# Patient Record
Sex: FEMALE | Race: WHITE | NOT HISPANIC OR LATINO | Employment: FULL TIME | URBAN - NONMETROPOLITAN AREA
[De-identification: names, ages, dates, MRNs, and addresses within clinical notes are randomized per-mention and may not be internally consistent; named-entity substitution may affect disease eponyms.]

---

## 2022-01-26 RX ORDER — AMITRIPTYLINE HYDROCHLORIDE 10 MG/1
10 TABLET, FILM COATED ORAL NIGHTLY
COMMUNITY
End: 2022-02-11

## 2022-01-26 RX ORDER — ASCORBIC ACID 500 MG
500 TABLET ORAL DAILY
COMMUNITY
End: 2022-11-17

## 2022-01-26 RX ORDER — SUMATRIPTAN 50 MG/1
50 TABLET, FILM COATED ORAL
COMMUNITY
End: 2023-01-24

## 2022-01-26 RX ORDER — SERTRALINE HYDROCHLORIDE 25 MG/1
12.5 TABLET, FILM COATED ORAL DAILY
COMMUNITY

## 2022-01-26 RX ORDER — LOSARTAN POTASSIUM 25 MG/1
25 TABLET ORAL DAILY
COMMUNITY

## 2022-01-26 RX ORDER — OMEPRAZOLE 40 MG/1
40 CAPSULE, DELAYED RELEASE ORAL DAILY
COMMUNITY
End: 2022-05-17 | Stop reason: DRUGHIGH

## 2022-01-26 RX ORDER — FAMOTIDINE 20 MG
1 TABLET ORAL DAILY
COMMUNITY

## 2022-02-11 ENCOUNTER — OFFICE VISIT (OUTPATIENT)
Dept: NEUROLOGY | Facility: CLINIC | Age: 47
End: 2022-02-11

## 2022-02-11 VITALS
DIASTOLIC BLOOD PRESSURE: 82 MMHG | BODY MASS INDEX: 27.61 KG/M2 | HEART RATE: 77 BPM | SYSTOLIC BLOOD PRESSURE: 122 MMHG | HEIGHT: 68 IN | WEIGHT: 182.2 LBS | OXYGEN SATURATION: 98 %

## 2022-02-11 DIAGNOSIS — G43.009 MIGRAINE WITHOUT AURA AND WITHOUT STATUS MIGRAINOSUS, NOT INTRACTABLE: Primary | ICD-10-CM

## 2022-02-11 PROCEDURE — 99204 OFFICE O/P NEW MOD 45 MIN: CPT | Performed by: PSYCHIATRY & NEUROLOGY

## 2022-02-11 RX ORDER — TOPIRAMATE 25 MG/1
TABLET ORAL
Qty: 120 TABLET | Refills: 3 | Status: SHIPPED | OUTPATIENT
Start: 2022-02-11 | End: 2022-05-17

## 2022-02-11 RX ORDER — ONDANSETRON 4 MG/1
4 TABLET, ORALLY DISINTEGRATING ORAL EVERY 8 HOURS PRN
COMMUNITY
Start: 2021-12-02

## 2022-02-11 NOTE — PROGRESS NOTES
Chief Complaint  Migraine (started 4-5 years ago. increased within the last 2 years located around the left frontal and pressure behind the left eye with nausea/vomiting. takes sumatriptan PRN which helps. pt states since december she has had 12 HA.)    Subjective        Adelita Ferrell presents to [facility]    Patient is a 46-year-old female who is referred for evaluation of headaches.  She started having headaches about 4 to 5 years ago.  Her blood pressure was high at that point so she was put on blood pressure medicine.  She thinks this helps somewhat but the headaches are still there.  In the last 2 years that have been worse.  They are located always on the left,  Mostly frontal and behind the eye.  She is currently having 2-3 headaches per week, although they have been more frequent already this month.  She says sometimes the pain will just linger and then she will feel foggy.  Without any rescue medicine, this could last for 2 to 3 days.  She describes the pain as pressure and throbbing.  She gets photophobia, nausea, and vomiting.  She denies any vision changes.  She was started on sumatriptan 50 mg recently and states this does help to abort a headache.  She was otherwise taking Tylenol.  Laying down helps the headache.  She will have to wear sunglasses or be in a dark room.  Last time she had her eyes checked was 4 years ago but no cause of her headache was seen during that exam.  She does states she has gained 45 pounds in the last 2 years likely due to the pandemic.  She does exercise.  She does endorse trouble sleeping but part of this is due to her 's work schedule.  No family history of migraines.  No head trauma.  She did have whiplash 20 years ago while riding a horse.  Her PCP started her on amitriptyline.  However she has not taken this because she read the side effects, and saw that it could cause headaches.              Past Medical History:   Diagnosis Date  "  • Hypertension    • Migraine           Current Outpatient Medications:   •  ascorbic acid (VITAMIN C) 500 MG tablet, Take 500 mg by mouth Daily., Disp: , Rfl:   •  Cobalamin Combinations (B12 Folate) 800-800 MCG capsule, Take 1 capsule by mouth Daily., Disp: , Rfl:   •  losartan (COZAAR) 25 MG tablet, Take 25 mg by mouth Daily., Disp: , Rfl:   •  omeprazole (priLOSEC) 40 MG capsule, Take 40 mg by mouth Daily., Disp: , Rfl:   •  ondansetron ODT (ZOFRAN-ODT) 4 MG disintegrating tablet, 1 TABLET ON THE TONGUE AND ALLOW TO DISSOLVE EVERY 8 HOURS AS NEEDED FOR NAUSEA FOR 5 DAYS, Disp: , Rfl:   •  sertraline (ZOLOFT) 25 MG tablet, Take 25 mg by mouth Daily., Disp: , Rfl:   •  SUMAtriptan (IMITREX) 50 MG tablet, Take 50 mg by mouth Every 2 (Two) Hours As Needed. Take one tablet at onset of headache. May repeat dose one time in 2 hours if headache not relieved., Disp: , Rfl:   •  Vitamin D, Cholecalciferol, 25 MCG (1000 UT) capsule, Take 1 capsule by mouth Daily., Disp: , Rfl:        Objective   Vital Signs:   /82 (BP Location: Right arm, Patient Position: Sitting, Cuff Size: Adult)   Pulse 77   Ht 172.7 cm (68\")   Wt 82.6 kg (182 lb 3.2 oz)   SpO2 98%   BMI 27.70 kg/m²     Physical Exam  Constitutional:       General: She is awake.   Eyes:      Extraocular Movements: Extraocular movements intact.      Pupils: Pupils are equal, round, and reactive to light.   Neurological:      Mental Status: She is alert.      Deep Tendon Reflexes: Strength normal and reflexes are normal and symmetric.   Psychiatric:         Speech: Speech normal.        Neurological Exam  Mental Status  Awake and alert. Oriented to person, place and time. Recent and remote memory are intact. Speech is normal. Language is fluent with no aphasia. Attention and concentration are normal. Fund of knowledge is appropriate for level of education.    Cranial Nerves  CN II: Visual fields full to confrontation.  CN III, IV, VI: Extraocular movements " intact bilaterally. Pupils equal round and reactive to light bilaterally.  CN V: Facial sensation is normal.  CN VII: Full and symmetric facial movement.  CN IX, X: Palate elevates symmetrically  CN XI: Shoulder shrug strength is normal.  CN XII: Tongue midline without atrophy or fasciculations.    Motor  Normal muscle bulk throughout. Normal muscle tone. No abnormal involuntary movements. Strength is 5/5 throughout all four extremities.    Sensory  Light touch is normal in upper and lower extremities.     Reflexes  Deep tendon reflexes are 2+ and symmetric in all four extremities with downgoing toes bilaterally.    Coordination  Right: Finger-to-nose normal.  Left: Finger-to-nose normal.    Gait  Casual gait is normal including stance, stride, and arm swing.      Result Review :                     Assessment and Plan   46-year-old female with migraine without aura.  She is having migraines frequently enough that she would benefit from a preventative medication.  We discussed multiple options and she decided to try topiramate.  I discussed the potential side effects with her.  She has no history of kidney stones or glaucoma.    Plan:    1.  We will start topiramate 25 mg twice daily for 1 week.  Then increase to 50 mg twice daily.  2.  Continue sumatriptan 50 mg for abortive treatment.  3.  She can try melatonin for sleep.  I recommend 3 or 5 mg only.  4.  Follow-up 3 months.        Follow Up   No follow-ups on file.  Patient was given instructions and counseling regarding her condition or for health maintenance advice. Please see specific information pulled into the AVS if appropriate.

## 2022-02-11 NOTE — PATIENT INSTRUCTIONS
Melatonin: 3 mg or 5 mg, take 30 minutes to an hour before  Topiramate: 25 mg twice a day for a week, then increase to 50 mg twice day

## 2022-02-17 ENCOUNTER — TELEPHONE (OUTPATIENT)
Dept: NEUROLOGY | Facility: CLINIC | Age: 47
End: 2022-02-17

## 2022-02-17 NOTE — TELEPHONE ENCOUNTER
Provider: DR. JACOBO   Caller: PEPE   Relationship to Patient: PT   Phone Number: [phone]  Reason for Call: PT CALLED AND STATES SHE PICKED UP SUMATRIPTAN AND THE PHARMACY STATES THAT THEY SENT SOMETHING OVER FOR TOPIRAMATE. PLEASE ADVISE.

## 2022-02-18 NOTE — TELEPHONE ENCOUNTER
[facility] said [facility] needs [facility].  Notified it has been approved from 2-14-22 to 5-14-22.  Mailbox is full, unable to leave a message for Adelita.

## 2022-05-17 ENCOUNTER — OFFICE VISIT (OUTPATIENT)
Dept: NEUROLOGY | Facility: CLINIC | Age: 47
End: 2022-05-17

## 2022-05-17 VITALS
HEART RATE: 63 BPM | HEIGHT: 68 IN | DIASTOLIC BLOOD PRESSURE: 82 MMHG | BODY MASS INDEX: 24.67 KG/M2 | OXYGEN SATURATION: 99 % | SYSTOLIC BLOOD PRESSURE: 122 MMHG | WEIGHT: 162.8 LBS

## 2022-05-17 DIAGNOSIS — G43.009 MIGRAINE WITHOUT AURA AND WITHOUT STATUS MIGRAINOSUS, NOT INTRACTABLE: Primary | ICD-10-CM

## 2022-05-17 DIAGNOSIS — G43.009 MIGRAINE WITHOUT AURA AND WITHOUT STATUS MIGRAINOSUS, NOT INTRACTABLE: ICD-10-CM

## 2022-05-17 PROCEDURE — 99214 OFFICE O/P EST MOD 30 MIN: CPT | Performed by: PSYCHIATRY & NEUROLOGY

## 2022-05-17 RX ORDER — OMEPRAZOLE 20 MG/1
20 CAPSULE, DELAYED RELEASE ORAL DAILY
COMMUNITY
Start: 2022-04-23

## 2022-05-17 RX ORDER — TOPIRAMATE 25 MG/1
TABLET ORAL
Qty: 360 TABLET | Refills: 1 | OUTPATIENT
Start: 2022-05-17

## 2022-05-17 RX ORDER — TOPIRAMATE 50 MG/1
50 TABLET, FILM COATED ORAL 2 TIMES DAILY
Qty: 180 TABLET | Refills: 3 | Status: SHIPPED | OUTPATIENT
Start: 2022-05-17 | End: 2023-01-24

## 2022-05-17 NOTE — PROGRESS NOTES
"Chief Complaint    Subjective        Adelita Ferrell presents to [facility]    46-year-old female presents for follow-up of migraines.  At last visit we started her on Topamax and she has done very well.  She has significant reduction in her migraines.  Sumatriptan is helping as needed.  She has had some metallic taste in her mouth but this is tolerable.          Past Medical History:   Diagnosis Date   • Hypertension    • Migraine           Current Outpatient Medications:   •  ascorbic acid (VITAMIN C) 500 MG tablet, Take 500 mg by mouth Daily., Disp: , Rfl:   •  Cobalamin Combinations (B12 Folate) 800-800 MCG capsule, Take 1 capsule by mouth Daily., Disp: , Rfl:   •  losartan (COZAAR) 25 MG tablet, Take 25 mg by mouth Daily., Disp: , Rfl:   •  omeprazole (priLOSEC) 20 MG capsule, Take 20 mg by mouth Daily., Disp: , Rfl:   •  ondansetron ODT (ZOFRAN-ODT) 4 MG disintegrating tablet, Place 4 mg on the tongue Every 8 (Eight) Hours As Needed for Nausea or Vomiting., Disp: , Rfl:   •  sertraline (ZOLOFT) 25 MG tablet, Take 25 mg by mouth Daily., Disp: , Rfl:   •  SUMAtriptan (IMITREX) 50 MG tablet, Take 50 mg by mouth Every 2 (Two) Hours As Needed. Take one tablet at onset of headache. May repeat dose one time in 2 hours if headache not relieved., Disp: , Rfl:   •  topiramate (TOPAMAX) 25 MG tablet, 1 pill BID x 1 week, then increase to 2 pills BID thereafter (Patient taking differently: Take 50 mg by mouth 2 (Two) Times a Day.), Disp: 120 tablet, Rfl: 3  •  Vitamin D, Cholecalciferol, 25 MCG (1000 UT) capsule, Take 1 capsule by mouth Daily., Disp: , Rfl:        Objective   Vital Signs:   /82 (BP Location: Left arm, Patient Position: Sitting, Cuff Size: Adult)   Pulse 63   Ht 172.7 cm (68\")   Wt 73.8 kg (162 lb 12.8 oz)   SpO2 99%   BMI 24.75 kg/m²     Physical Exam  Eyes:      Extraocular Movements: Extraocular movements intact.   Neurological:      Deep Tendon Reflexes: Strength " normal.   Psychiatric:         Speech: Speech normal.        Neurological Exam  Mental Status  Awake, alert and oriented to person, place and time. Speech is normal. Language is fluent with no aphasia.    Cranial Nerves  CN III, IV, VI: Extraocular movements intact bilaterally.  CN VII: Full and symmetric facial movement.    Motor   Strength is 5/5 throughout all four extremities.    Gait  Casual gait is normal including stance, stride, and arm swing.      Result Review :                     Assessment and Plan   46-year-old female with migraines.  She has had significant benefit on topiramate so we will continue.  Also doing well on sumatriptan as needed.    Plan:    1.  Continue topiramate 50 mg twice daily.  2.  Continue sumatriptan 50 mg as needed.  3.  Follow-up 6 months.  Call sooner with any issues.        Follow Up   No follow-ups on file.  Patient was given instructions and counseling regarding her condition or for health maintenance advice. Please see specific information pulled into the AVS if appropriate.

## 2022-11-17 ENCOUNTER — TELEPHONE (OUTPATIENT)
Dept: NEUROLOGY | Facility: CLINIC | Age: 47
End: 2022-11-17

## 2022-11-17 ENCOUNTER — OFFICE VISIT (OUTPATIENT)
Dept: NEUROLOGY | Facility: CLINIC | Age: 47
End: 2022-11-17

## 2022-11-17 VITALS
DIASTOLIC BLOOD PRESSURE: 86 MMHG | SYSTOLIC BLOOD PRESSURE: 124 MMHG | HEIGHT: 68 IN | BODY MASS INDEX: 24.92 KG/M2 | OXYGEN SATURATION: 98 % | WEIGHT: 164.4 LBS | HEART RATE: 72 BPM

## 2022-11-17 DIAGNOSIS — N92.1 MENORRHAGIA WITH IRREGULAR CYCLE: Primary | ICD-10-CM

## 2022-11-17 DIAGNOSIS — G43.719 INTRACTABLE CHRONIC MIGRAINE WITHOUT AURA AND WITHOUT STATUS MIGRAINOSUS: ICD-10-CM

## 2022-11-17 PROCEDURE — 99214 OFFICE O/P EST MOD 30 MIN: CPT | Performed by: PSYCHIATRY & NEUROLOGY

## 2022-11-17 RX ORDER — BIOTIN 1000 MCG
1 TABLET,CHEWABLE ORAL DAILY
COMMUNITY

## 2022-11-17 RX ORDER — RIMEGEPANT SULFATE 75 MG/75MG
75 TABLET, ORALLY DISINTEGRATING ORAL ONCE AS NEEDED
Qty: 16 TABLET | Refills: 5 | Status: SHIPPED | OUTPATIENT
Start: 2022-11-17

## 2022-11-17 NOTE — PROGRESS NOTES
"Chief Complaint    Subjective        Adelita Ferrell presents to [facility]    History of Present Illness  46-year-old female presents for follow-up of migraines.  Topamax has helped but still having significant migraines. Will last for days. Also having continued heavy periods.         Past Medical History:   Diagnosis Date   • Hypertension    • Migraine           Current Outpatient Medications:   •  ascorbic acid (VITAMIN C) 500 MG tablet, Take 500 mg by mouth Daily., Disp: , Rfl:   •  Cobalamin Combinations (B12 Folate) 800-800 MCG capsule, Take 1 capsule by mouth Daily., Disp: , Rfl:   •  losartan (COZAAR) 25 MG tablet, Take 25 mg by mouth Daily., Disp: , Rfl:   •  omeprazole (priLOSEC) 20 MG capsule, Take 20 mg by mouth Daily., Disp: , Rfl:   •  ondansetron ODT (ZOFRAN-ODT) 4 MG disintegrating tablet, Place 4 mg on the tongue Every 8 (Eight) Hours As Needed for Nausea or Vomiting., Disp: , Rfl:   •  sertraline (ZOLOFT) 25 MG tablet, Take 25 mg by mouth Daily., Disp: , Rfl:   •  SUMAtriptan (IMITREX) 50 MG tablet, Take 50 mg by mouth Every 2 (Two) Hours As Needed. Take one tablet at onset of headache. May repeat dose one time in 2 hours if headache not relieved., Disp: , Rfl:   •  topiramate (Topamax) 50 MG tablet, Take 1 tablet by mouth 2 (Two) Times a Day for 360 days., Disp: 180 tablet, Rfl: 3  •  Vitamin D, Cholecalciferol, 25 MCG (1000 UT) capsule, Take 1 capsule by mouth Daily., Disp: , Rfl:        Objective   Vital Signs:   Ht 172.7 cm (68\")   BMI 24.75 kg/m²     Physical Exam  Eyes:      Extraocular Movements: Extraocular movements intact.   Neurological:      Motor: Motor strength is normal.   Psychiatric:         Speech: Speech normal.        Neurological Exam  Mental Status  Awake, alert and oriented to person, place and time. Speech is normal. Language is fluent with no aphasia.    Cranial Nerves  CN III, IV, VI: Extraocular movements intact bilaterally.  CN VII: Full " and symmetric facial movement.    Motor   Strength is 5/5 throughout all four extremities.    Gait  Casual gait is normal including stance, stride, and arm swing.      Result Review :                     Assessment and Plan   46-year-old female with migraines. Topamax not working well enough and having some side effects.     Plan:    1.  Wean off topamax.  2. Start Emgality. First injections done in clinic today.  3. Try Nurtec for abortive at onset. Can try Ubrelvy if no benefit.  4. Referral to Dr. Valenzuela for heavy bleeding.  5. Follow up 2 months.       Follow Up   No follow-ups on file.  Patient was given instructions and counseling regarding her condition or for health maintenance advice. Please see specific information pulled into the AVS if appropriate.

## 2022-11-17 NOTE — TELEPHONE ENCOUNTER
Provider: DIAL  Caller: BAHMAN   Relationship to Patient: [facility]  Phone Number: [phone]  Reason for Call: BAHMAN FROM [employer] CALLED REGARDING PA FOR EMGALITY    DOES THE PATIENT USE THE MEDICATION IN COMBINATION WITH ANOTHER CGRP AGENT?    DOES THE PT REQUIRE 2 SYRINGES FOR INITIAL THERAPY?    CAN CALL OR FAX THE ANSWERS TO     PHONE#[phone]  FAX # [phone]    REFERENCE #[id number]    PLEASE REVIEW AND ADVISE     THANK YOU

## 2022-12-07 ENCOUNTER — TELEPHONE (OUTPATIENT)
Dept: NEUROLOGY | Facility: CLINIC | Age: 47
End: 2022-12-07

## 2022-12-07 NOTE — TELEPHONE ENCOUNTER
Spoke with the patient and advised that after speaking with her pharmacy, the Emgality will need to be ordered. She can  the nurtec now with her co-pay card. Patient verbalizes understanding.

## 2022-12-07 NOTE — TELEPHONE ENCOUNTER
PATIENT WENT TO PHARMACY TO  NURTEC AND EMGALITY - SHE WAS TOLD THE PHARMACY NEEDS A CALL FROM OFFICE RELEASING THE MEDICATIONS BEFORE THEY WILL LET HER HAVE THEM.    PLEASE CALL PHARMACY    [facility]/PHARMACY #[id number] - [place] [id number] [facility] [phone] [facility] [phone] FX      THANK YOU

## 2022-12-14 NOTE — TELEPHONE ENCOUNTER
[facility] said [facility] are [facility] Emgality to come in.  They used the co-pay card and Nurtec went through.  Message left for Adelita requesting a return call.

## 2022-12-14 NOTE — TELEPHONE ENCOUNTER
PATIENT CALLED BACK, STATES THE PHARMACY TOLD HER THAT THE MEDICATION HAS NOT BEEN RELEASED, PLEASE CONTACT PHARMACY.    THANK YOU    PATIENT'S CALLBACK NUMBER -[phone]

## 2023-01-12 ENCOUNTER — TELEPHONE (OUTPATIENT)
Dept: NEUROLOGY | Facility: CLINIC | Age: 48
End: 2023-01-12
Payer: COMMERCIAL

## 2023-01-12 NOTE — TELEPHONE ENCOUNTER
Provider: DR DIAL  Caller: PATIENT  Relationship to Patient: SELF  Phone Number: [phone]  Reason for Call: PATIENT CALLED REGARDING HER APPT ON 1/19/23 AT 11:15. PATIENT HAS OTHER APPT ON THIS DAY IN [place] AT 10 &10:30. PATIENT WOULD LIKE TO KNOW IF THERE IS ANY WAY TO MOVE HER 11:15 TO A LITTLE BIT LATER IN THE DAY TO MAKE SURE SHE CAN MAKE IT WHILE STILL KEEPING ALL HER APPTS. PLEASE REVIEW AND ADVISE. THANK YOU.

## 2023-01-12 NOTE — TELEPHONE ENCOUNTER
Explained that Dr. Tello doesn't have a later appointment available.  She is going to reschedule, she needs to see the dermatologist that morning.

## 2023-01-24 ENCOUNTER — OFFICE VISIT (OUTPATIENT)
Dept: OBSTETRICS AND GYNECOLOGY | Facility: CLINIC | Age: 48
End: 2023-01-24
Payer: COMMERCIAL

## 2023-01-24 VITALS
BODY MASS INDEX: 25.46 KG/M2 | DIASTOLIC BLOOD PRESSURE: 84 MMHG | HEIGHT: 68 IN | WEIGHT: 168 LBS | SYSTOLIC BLOOD PRESSURE: 132 MMHG

## 2023-01-24 DIAGNOSIS — Z12.4 ENCOUNTER FOR SCREENING FOR CERVICAL CANCER: ICD-10-CM

## 2023-01-24 DIAGNOSIS — Z01.419 ENCOUNTER FOR GYNECOLOGICAL EXAMINATION WITHOUT ABNORMAL FINDING: ICD-10-CM

## 2023-01-24 DIAGNOSIS — N95.1 PERIMENOPAUSAL: Primary | ICD-10-CM

## 2023-01-24 DIAGNOSIS — R68.82 DECREASED LIBIDO: ICD-10-CM

## 2023-01-24 DIAGNOSIS — N83.291 COMPLEX CYST OF RIGHT OVARY: ICD-10-CM

## 2023-01-24 DIAGNOSIS — Z87.891 FORMER SMOKER: ICD-10-CM

## 2023-01-24 PROCEDURE — 99202 OFFICE O/P NEW SF 15 MIN: CPT | Performed by: OBSTETRICS & GYNECOLOGY

## 2023-01-24 PROCEDURE — 87624 HPV HI-RISK TYP POOLED RSLT: CPT | Performed by: OBSTETRICS & GYNECOLOGY

## 2023-01-24 PROCEDURE — G0123 SCREEN CERV/VAG THIN LAYER: HCPCS | Performed by: OBSTETRICS & GYNECOLOGY

## 2023-01-24 PROCEDURE — 99386 PREV VISIT NEW AGE 40-64: CPT | Performed by: OBSTETRICS & GYNECOLOGY

## 2023-01-24 RX ORDER — TRIAMCINOLONE ACETONIDE 1 MG/G
CREAM TOPICAL
COMMUNITY
Start: 2023-01-19 | End: 2023-03-23

## 2023-01-24 NOTE — PROGRESS NOTES
"Subjective      Adelita Ferrell is a 47 y.o. female who presents for her routine annual exam.     Patient reports she has been having bleeding problems for years.  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  She also reports post-coital bleeding after every encounter.  She reports cramping and bloating with bleeding.  All of this until recently, and periods have just completely stopped for the past four months.  Patient denies hot flashes or night sweats.    Patient reports she is tired and feels like her hormones are \"off\".    Current contraception: tubal ligation  Regular self breast exam: occasionally  History of abnormal Pap smear: no  History of abnormal mammogram: no  Exercise: occasionally    The following portions of the patient's history were reviewed and updated as appropriate: allergies, current medications, past family history, past medical history, past social history, past surgical history and problem list.    heterosexual    Family History  Family History   Problem Relation Age of Onset   • No Known Problems Mother    • No Known Problems Father        Review of Systems  Review of Systems   Constitutional: Negative for activity change and unexpected weight loss.   HENT: Negative for congestion.    Respiratory: Negative for shortness of breath.    Cardiovascular: Negative for chest pain.   Gastrointestinal: Negative for abdominal pain, blood in stool, constipation and diarrhea.        Just had colonoscopy last Tuesday   Endocrine: Negative for cold intolerance and heat intolerance.   Genitourinary: Positive for amenorrhea (since 4 months ago), breast pain (just \"overly sensitive\" sometimes) and decreased libido. Negative for difficulty urinating, dyspareunia, frequency, pelvic pain, urgency, urinary incontinence, vaginal discharge and vaginal pain.   Musculoskeletal: Negative for arthralgias, back " "pain, neck pain and neck stiffness.   Skin: Negative for rash.   Neurological: Positive for headache (cared for by Dr. Son in neurology). Negative for dizziness.   Psychiatric/Behavioral: Negative for sleep disturbance and depressed mood. The patient is nervous/anxious (well-contolled with zoloft).              Objective        /84 (BP Location: Right arm, Patient Position: Sitting, Cuff Size: Adult)   Ht 172.7 cm (68\")   Wt 76.2 kg (168 lb)   LMP 11/14/2022 (Approximate)   Breastfeeding No   BMI 25.54 kg/m²   Physical Exam  Vitals and nursing note reviewed. Exam conducted with a chaperone present.   Constitutional:       General: She is not in acute distress.     Appearance: She is well-developed.   HENT:      Head: Normocephalic and atraumatic.   Cardiovascular:      Rate and Rhythm: Normal rate and regular rhythm.      Heart sounds: No murmur heard.  Pulmonary:      Effort: Pulmonary effort is normal.      Breath sounds: Normal breath sounds.   Chest:   Breasts:     Right: No inverted nipple or mass.      Left: No inverted nipple or mass.   Abdominal:      General: There is no distension.      Palpations: Abdomen is soft.      Tenderness: There is no abdominal tenderness.   Genitourinary:     General: Normal vulva.      Exam position: Lithotomy position.      Labia:         Right: No tenderness or lesion.         Left: No tenderness or lesion.       Vagina: Normal. No vaginal discharge, tenderness or bleeding.      Cervix: No cervical motion tenderness, discharge or friability.      Adnexa:         Right: No tenderness or fullness.          Left: No tenderness or fullness.     Musculoskeletal:         General: Normal range of motion.      Cervical back: Normal range of motion and neck supple.   Skin:     General: Skin is warm and dry.   Neurological:      Mental Status: She is alert and oriented to person, place, and time.   Psychiatric:         Behavior: Behavior normal.         Judgment: Judgment " "normal.     Pelvic u/s:    Impression:     1.  Uterus: Normal size and Anteverted     2.  Endometrium: Not well visualized, reported history of ablation     3.  Myometrium: Normal homogenous texture      4.  Ovaries  Left:    Normal/unremarkable   Right:  Simple cyst 5 cm , Complex cystic mass 6.2 cm  and Has a lacy appearance          Assessment  & Plan    Diagnoses and all orders for this visit:    1. Perimenopausal (Primary): Patient's menses have become irregular, and she has completely skipped menses for the last several months.  The patient has not, however, having vasomotor symptoms.  Additionally, the patient feels like her hormones are \"off\".  We will check hormone levels today to see if the patient is now menopausal  -     Estradiol  -     Follicle Stimulating Hormone  -     Luteinizing Hormone  -     Progesterone  -     Testosterone    2. Decreased libido: Testosterone being included in hormone panel drawn today    3. Complex cyst of right ovary: Patient pelvic ultrasound ordered to investigate changes in bleeding pattern.  The patient was incidentally noted to have a complex cystic mass associated with the right ovary.  The patient has no discomfort or pain on her right side.  The patient has no pelvic complaints.  Shahnaz being drawn today and the patient understands that she will need to see GYN oncology if that is positive.  If the patient's Shahnaz is low risk, the patient will return to the office for a surveillance ultrasound in 3 months.  For now, the patient will have a follow-up appointment made in 3 weeks to return and discuss labs, including her hormone panel.  -     Ovarian Malignancy Risk-SHAHNAZ    4. Encounter for screening for cervical cancer  -     Liquid-based Pap Smear, Screening  -     HPV DNA Probe, Direct - ThinPrep Vial, Cervix, Endocervix    5. Encounter for gynecological examination without abnormal finding: Exam unremarkable.  PAP collected.  Hormones drawn, but other routine screening " labs are managed by PCP.  Patient has upcoming mammogram scheduled, which was also ordered by PCP.  She reports colonoscopy to be up-to-date - it was just done last week but she had a cancerous polyp removed.  Patient has follow-up scheduled with surgeon next week to discuss plan    6. Former smoker        This note was electronically signed.    Sue Valenzuela MD  1/24/2023  12:00 CST

## 2023-01-25 LAB
CANCER AG125 SERPL-ACNC: 9.7 U/ML (ref 0–38.1)
ESTRADIOL SERPL-MCNC: 144 PG/ML
FSH SERPL-ACNC: 4.7 MIU/ML
GEN CATEG CVX/VAG CYTO-IMP: NORMAL
HE4 SERPL-SCNC: 58.9 PMOL/L (ref 0–63.6)
HPV I/H RISK 4 DNA CVX QL PROBE+SIG AMP: NOT DETECTED
LAB AP CASE REPORT: NORMAL
LAB AP GYN ADDITIONAL INFORMATION: NORMAL
LABORATORY COMMENT REPORT: NORMAL
LH SERPL-ACNC: 5.4 MIU/ML
Lab: NORMAL
PATH INTERP SPEC-IMP: NORMAL
POSTMENOPAUSAL INTERP: LOW: NORMAL
PREMENOPAUSAL INTERP: LOW: NORMAL
PROGEST SERPL-MCNC: 10.5 NG/ML
ROMA SCORE POSTMEN SERPL: 1.01
ROMA SCORE PREMEN SERPL: 1.04
STAT OF ADQ CVX/VAG CYTO-IMP: NORMAL
TESTOST SERPL-MCNC: 6 NG/DL (ref 4–50)

## 2023-02-02 ENCOUNTER — TELEPHONE (OUTPATIENT)
Dept: OBSTETRICS AND GYNECOLOGY | Facility: CLINIC | Age: 48
End: 2023-02-02
Payer: COMMERCIAL

## 2023-02-02 NOTE — TELEPHONE ENCOUNTER
Pt updated on result note and voiced understanding.  Result note forwarded to Courtney at  to schedule pt for an appt in 3 months.

## 2023-02-14 ENCOUNTER — OFFICE VISIT (OUTPATIENT)
Dept: OBSTETRICS AND GYNECOLOGY | Facility: CLINIC | Age: 48
End: 2023-02-14
Payer: COMMERCIAL

## 2023-02-14 VITALS
BODY MASS INDEX: 25.76 KG/M2 | HEIGHT: 68 IN | SYSTOLIC BLOOD PRESSURE: 128 MMHG | DIASTOLIC BLOOD PRESSURE: 84 MMHG | WEIGHT: 170 LBS

## 2023-02-14 DIAGNOSIS — G47.00 INSOMNIA, UNSPECIFIED TYPE: ICD-10-CM

## 2023-02-14 DIAGNOSIS — R53.83 OTHER FATIGUE: ICD-10-CM

## 2023-02-14 DIAGNOSIS — N83.291 COMPLEX CYST OF RIGHT OVARY: ICD-10-CM

## 2023-02-14 DIAGNOSIS — N95.1 PERIMENOPAUSAL: Primary | ICD-10-CM

## 2023-02-14 PROCEDURE — 99214 OFFICE O/P EST MOD 30 MIN: CPT | Performed by: OBSTETRICS & GYNECOLOGY

## 2023-02-15 LAB — TSH SERPL DL<=0.005 MIU/L-ACNC: 2.58 UIU/ML (ref 0.27–4.2)

## 2023-02-15 NOTE — PROGRESS NOTES
Subjective   Chief Complaint   Patient presents with   • Ovarian Cyst     Pt here today for follow up after having CARMEN labs. Pt has 3 months follow up and ultrasound in May. Pt voices no other concerns.      Adelita Ferrell is a 47 y.o. year old .  No LMP recorded. Patient has had an ablation.  She presents to be seen to discuss recent labs and plan. Patient was just here a few weeks ago for complex ovarian cyst.  Tumor markers were drawn at that time and returned as low risk.  My recommendation, based on those findings and the fact that she was not having pain, was to return in 3 months for another pelvic ultrasound.  The patient is here today, accompanied by her , to discuss with her labs and the plan.    Component  Ref Range & Units 3 wk ago     0.0 - 38.1 U/mL 9.7    Comment: [employer] Electrochemiluminescence Immunoassay (ECLIA)   Values obtained with different assay methods or kits cannot be   used interchangeably.  Results cannot be interpreted as absolute   evidence of the presence or absence of malignant disease.    HE4  0.0 - 63.6 pmol/L 58.9    Comment: [employer] Electrochemiluminescence Immunoassay (ECLIA)   Values obtained with different assay methods or kits cannot be   used interchangeably.  Results cannot be interpreted as absolute   evidence of the presence or absence of malignant disease.    Premenopausal CARMEN  See below 1.04    Postmenopausal CARMEN  See below 1.01    Comment Comment    Comment: The Risk for Ovarian Malignancy Algorithm (CARMEN) test is intended to   aid in assessing whether a premenopausal or postmenopausal woman who   presents with an ovarian adnexal mass is at high or low likelihood   of finding malignancy on surgery. CARMEN is indicated for women who   meet the following criteria: over age 18; ovarian adnexal mass   present for which surgery is planned, and not yet referred to an   oncologist. CARMEN must be interpreted in conjunction with an  "  independent clinical and radiological assessment. The test is not   intended as a screening or stand-alone diagnostic assay.   HE4 and  values obtained with different assay methods or kits   cannot be used interchangeably.          The following portions of the patient's history were reviewed and updated as appropriate:current medications and allergies    Social History    Tobacco Use      Smoking status: Former        Types: Cigarettes      Smokeless tobacco: Never    Review of Systems   Constitutional: Negative for activity change and unexpected weight change.   Respiratory: Negative for shortness of breath.    Cardiovascular: Negative for chest pain.   Gastrointestinal: Negative for abdominal pain.   Genitourinary: Positive for menstrual problem (becoming irregular recently). Negative for pelvic pain.         Objective   /84   Ht 172.7 cm (68\")   Wt 77.1 kg (170 lb)   BMI 25.85 kg/m²     Physical Exam  Vitals and nursing note reviewed.   Constitutional:       General: She is not in acute distress.     Appearance: She is well-developed.   HENT:      Head: Normocephalic and atraumatic.   Neck:      Thyroid: No thyromegaly.   Pulmonary:      Effort: Pulmonary effort is normal.   Musculoskeletal:         General: Normal range of motion.      Cervical back: Normal range of motion.   Skin:     General: Skin is warm and dry.   Neurological:      Mental Status: She is alert and oriented to person, place, and time.   Psychiatric:         Behavior: Behavior normal.         Judgment: Judgment normal.         Lab Review   Recent tumor markers    Imaging   Pelvic ultrasound report     Assessment & Plan    Diagnoses and all orders for this visit:    1. Perimenopausal (Primary): Menses getting to be irregular.    2. Other fatigue: Hormone levels were checked at the patient's recent visit and found to be normal.  The patient would like to have her TSH checked today.  Adelita feels like she is chronically fatigued, " but also reports that she only sleeps about 5 or 5-1/2 hours every night.  -     TSH    3. Complex cyst of right ovary: Shahnaz was low risk for both premenopausal and postmenopausal ranges.  The patient understands that this does not completely rule out the chance that there is an early ovarian malignancy, but understands that this would be a very small risk.  This has been weighed with the surgical risks of laparoscopic removal of the ovary, although the patient has been offered surgery if that is how she chooses to proceed.  Questions for the patient and her  have all been answered to their satisfaction.  We have all agreed on a plan return to the office in May for repeat pelvic ultrasound and office visit.  Decision about whether or not to proceed to surgery will depend on how the ovarian cyst looks on imaging at that time.    4. Insomnia, unspecified type: We discussed the patient's sleep hygiene, and the avoidance of blue wavelength light in the 1 and half hours before bedtime.  We have also discussed delayed release melatonin since the patient says that she falls asleep very easily but then wakes up every morning about 2 AM.      This note was electronically signed.    Sue Valenzuela MD  February 15, 2023  13:21 CST    Total time spent today with Adelita  was 30-39 minutes (level 4).  Greater than 50% of the time was spent coordinating care, answering her questions and counseling regarding pathophysiology of her presenting problem along with plans for any diagnositc work-up and treatment.       normal...

## 2023-03-23 ENCOUNTER — OFFICE VISIT (OUTPATIENT)
Dept: NEUROLOGY | Facility: CLINIC | Age: 48
End: 2023-03-23
Payer: COMMERCIAL

## 2023-03-23 VITALS
SYSTOLIC BLOOD PRESSURE: 102 MMHG | HEART RATE: 72 BPM | HEIGHT: 68 IN | BODY MASS INDEX: 26.8 KG/M2 | DIASTOLIC BLOOD PRESSURE: 78 MMHG | WEIGHT: 176.8 LBS

## 2023-03-23 DIAGNOSIS — G43.719 INTRACTABLE CHRONIC MIGRAINE WITHOUT AURA AND WITHOUT STATUS MIGRAINOSUS: Primary | ICD-10-CM

## 2023-03-23 PROCEDURE — 99214 OFFICE O/P EST MOD 30 MIN: CPT | Performed by: PSYCHIATRY & NEUROLOGY

## 2023-03-23 NOTE — PROGRESS NOTES
"Chief Complaint    Subjective        Adelita Ferrell presents to [facility]    History of Present Illness  47-year-old female presents for follow-up.  She has had significant improvement in migraine since starting Emgality.  Only having an occasional dull headache for which she will take an OTC or Nurtec.          Past Medical History:   Diagnosis Date   • Hypertension    • Migraine           Current Outpatient Medications:   •  Biotin 1000 MCG chewable tablet, Chew 1 tablet Daily., Disp: , Rfl:   •  Cobalamin Combinations (B12 Folate) 800-800 MCG capsule, Take 1 capsule by mouth Daily., Disp: , Rfl:   •  galcanezumab-gnlm (EMGALITY) 120 MG/ML auto-injector pen, Inject 1 mL under the skin into the appropriate area as directed Every 30 (Thirty) Days., Disp: 1.12 mL, Rfl: 11  •  losartan (COZAAR) 25 MG tablet, Take 1 tablet by mouth Daily., Disp: , Rfl:   •  omeprazole (priLOSEC) 20 MG capsule, Take 1 capsule by mouth Daily., Disp: , Rfl:   •  ondansetron ODT (ZOFRAN-ODT) 4 MG disintegrating tablet, Place 1 tablet on the tongue Every 8 (Eight) Hours As Needed for Nausea or Vomiting., Disp: , Rfl:   •  Rimegepant Sulfate (Nurtec) 75 MG tablet dispersible tablet, Take 1 tablet by mouth 1 (One) Time As Needed (migraine) for up to 1 dose., Disp: 16 tablet, Rfl: 5  •  sertraline (ZOLOFT) 25 MG tablet, Take 12.5 mg by mouth Daily., Disp: , Rfl:   •  Vitamin D, Cholecalciferol, 25 MCG (1000 UT) capsule, Take 1 capsule by mouth Daily., Disp: , Rfl:        Objective   Vital Signs:   /78 (BP Location: Left arm, Patient Position: Sitting, Cuff Size: Large Adult)   Pulse 72   Ht 172.7 cm (68\")   Wt 80.2 kg (176 lb 12.8 oz)   BMI 26.88 kg/m²     Physical Exam  Eyes:      Extraocular Movements: Extraocular movements intact.   Neurological:      Motor: Motor strength is normal.   Psychiatric:         Speech: Speech normal.        Neurological Exam  Mental Status  Awake, alert and oriented to " person, place and time. Speech is normal. Language is fluent with no aphasia.    Cranial Nerves  CN III, IV, VI: Extraocular movements intact bilaterally.  CN VII: Full and symmetric facial movement.    Motor   Strength is 5/5 throughout all four extremities.    Gait  Casual gait is normal including stance, stride, and arm swing.      Result Review :                     Assessment and Plan   47-year-old female with migraines.  Failed Topamax.  Significant benefit with Emgality and Nurtec.    Plan:    1.  Continue Emgality and Nurtec.  2.  Follow-up 6 months.      Follow Up   No follow-ups on file.  Patient was given instructions and counseling regarding her condition or for health maintenance advice. Please see specific information pulled into the AVS if appropriate.

## 2023-03-28 DIAGNOSIS — R10.9 ABDOMINAL PAIN, UNSPECIFIED ABDOMINAL LOCATION: Primary | ICD-10-CM

## 2023-04-24 ENCOUNTER — TELEPHONE (OUTPATIENT)
Dept: OBSTETRICS AND GYNECOLOGY | Facility: CLINIC | Age: 48
End: 2023-04-24
Payer: COMMERCIAL

## 2023-04-24 DIAGNOSIS — R10.9 ABDOMINAL PAIN, UNSPECIFIED ABDOMINAL LOCATION: Primary | ICD-10-CM

## 2023-04-24 NOTE — TELEPHONE ENCOUNTER
Erendira with [facility] called needing a lab order for BMP and CMP to be drawn prior to patients CT scan.  Fax number [phone]

## 2023-05-02 ENCOUNTER — TELEPHONE (OUTPATIENT)
Dept: OBSTETRICS AND GYNECOLOGY | Facility: CLINIC | Age: 48
End: 2023-05-02
Payer: COMMERCIAL

## 2023-05-02 ENCOUNTER — OFFICE VISIT (OUTPATIENT)
Dept: OBSTETRICS AND GYNECOLOGY | Facility: CLINIC | Age: 48
End: 2023-05-02
Payer: COMMERCIAL

## 2023-05-02 VITALS
HEIGHT: 68 IN | SYSTOLIC BLOOD PRESSURE: 126 MMHG | BODY MASS INDEX: 26.07 KG/M2 | WEIGHT: 172 LBS | DIASTOLIC BLOOD PRESSURE: 84 MMHG

## 2023-05-02 DIAGNOSIS — N83.291 COMPLEX CYST OF RIGHT OVARY: Primary | ICD-10-CM

## 2023-05-02 DIAGNOSIS — R10.9 ABDOMINAL PAIN, UNSPECIFIED ABDOMINAL LOCATION: Primary | ICD-10-CM

## 2023-05-02 PROCEDURE — 99213 OFFICE O/P EST LOW 20 MIN: CPT | Performed by: OBSTETRICS & GYNECOLOGY

## 2023-05-02 RX ORDER — TRIAMCINOLONE ACETONIDE 1 MG/G
CREAM TOPICAL
COMMUNITY
Start: 2023-05-01

## 2023-05-02 NOTE — TELEPHONE ENCOUNTER
Adelita from [facility] needs you to sign off on CMP, pended for review, I cannot send verbal with readback.

## 2023-05-02 NOTE — PROGRESS NOTES
"Subjective   Chief Complaint   Patient presents with   • Ovarian Cyst     Pt here today for 3 month follow up on ovarian cyst. Pt had ultrasound today. Pt voices no other concerns.      Adelita Ferrell is a 47 y.o. year old .  No LMP recorded. Patient has had an ablation.  She presents to be seen for follow-up of complex ovarian cyst.  Patient had a repeat pelvic u/s today.  She is currently having no problems and denies any abdominal or pelvic pain.     The following portions of the patient's history were reviewed and updated as appropriate:current medications and allergies    Social History    Tobacco Use      Smoking status: Former        Types: Cigarettes      Smokeless tobacco: Never    Review of Systems   Constitutional: Negative for activity change and unexpected weight change.   Respiratory: Negative for shortness of breath.    Cardiovascular: Negative for chest pain.   Gastrointestinal: Negative for abdominal pain.   Genitourinary: Negative for pelvic pain.         Objective   /84   Ht 172.7 cm (68\")   Wt 78 kg (172 lb)   BMI 26.15 kg/m²     Physical Exam  Vitals and nursing note reviewed.   Constitutional:       General: She is not in acute distress.     Appearance: She is well-developed.   HENT:      Head: Normocephalic and atraumatic.   Neck:      Thyroid: No thyromegaly.   Pulmonary:      Effort: Pulmonary effort is normal.   Abdominal:      General: There is no distension.      Palpations: Abdomen is soft.      Tenderness: There is no abdominal tenderness.   Genitourinary:     Comments: U/s today: uterus 9 X 6 X 4.8, with both ovaries unremarkable - no cysts on either  Musculoskeletal:         General: Normal range of motion.      Cervical back: Normal range of motion.   Skin:     General: Skin is warm and dry.   Neurological:      Mental Status: She is alert and oriented to person, place, and time.   Psychiatric:         Mood and Affect: Mood normal.         Behavior: Behavior normal. "         Thought Content: Thought content normal.         Judgment: Judgment normal.         Lab Review   No data reviewed    Imaging   Pelvic ultrasound report     Assessment & Plan    Diagnoses and all orders for this visit:    1. Complex cyst of right ovary (Primary): Resolved spontaneously.  Pelvic u/s today completely normal.  Patient having no discomfort or complaints.  Will RTO for annual exam, or prn.  Patient has no other questions or concerns.      This note was electronically signed.    Sue Valenzuela MD  May 2, 2023  11:34 CDT    Total time spent today with Adelita  was 20-29 minutes (level 3).  Greater than 50% of the time was spent coordinating care, answering her questions and counseling regarding pathophysiology of her presenting problem along with plans for any diagnositc work-up and treatment.

## 2023-06-14 ENCOUNTER — TELEPHONE (OUTPATIENT)
Dept: OBSTETRICS AND GYNECOLOGY | Facility: CLINIC | Age: 48
End: 2023-06-14
Payer: COMMERCIAL

## 2023-06-14 NOTE — TELEPHONE ENCOUNTER
Called to f/u with Erendira Shen at [facility] Called to f/u with pt on CT that was ordered and looks like it was going to be done at [facility] but we never received the report, she informed me there have been numerous attempts to our office and have not been able to get this resolved.  I informed her the last encounter I had was when I sent over the lab order and she informed me it couldn't be used.  I informed her we were not made aware of this and could not fix problems we did not know existed, she began yelling at me on the phone and informed me she's had multiple issues and I informed her we would just use a different facility.  I called to f/u with pt to see if there was a different facility she would rather use, no answer, left voicemail to return my call at her earliest convenience.

## 2023-06-14 NOTE — TELEPHONE ENCOUNTER
Pt returned call and stated that she is feeling much better and no longer wants to get the CT done anyway, but would definitely use a different facility if something changed and would call and let us know.

## 2023-08-28 DIAGNOSIS — G43.719 INTRACTABLE CHRONIC MIGRAINE WITHOUT AURA AND WITHOUT STATUS MIGRAINOSUS: ICD-10-CM

## 2023-08-28 RX ORDER — RIMEGEPANT SULFATE 75 MG/75MG
75 TABLET, ORALLY DISINTEGRATING ORAL AS NEEDED
Qty: 8 TABLET | Refills: 5 | Status: SHIPPED | OUTPATIENT
Start: 2023-08-28

## 2023-09-06 DIAGNOSIS — G43.719 INTRACTABLE CHRONIC MIGRAINE WITHOUT AURA AND WITHOUT STATUS MIGRAINOSUS: Primary | ICD-10-CM

## 2023-09-06 RX ORDER — RIZATRIPTAN BENZOATE 10 MG/1
10 TABLET ORAL ONCE AS NEEDED
Qty: 9 TABLET | Refills: 0 | Status: SHIPPED | OUTPATIENT
Start: 2023-09-06

## 2023-09-27 ENCOUNTER — TELEPHONE (OUTPATIENT)
Dept: NEUROLOGY | Facility: CLINIC | Age: 48
End: 2023-09-27
Payer: COMMERCIAL

## 2023-09-28 ENCOUNTER — OFFICE VISIT (OUTPATIENT)
Dept: NEUROLOGY | Facility: CLINIC | Age: 48
End: 2023-09-28
Payer: COMMERCIAL

## 2023-09-28 VITALS
HEART RATE: 68 BPM | DIASTOLIC BLOOD PRESSURE: 72 MMHG | WEIGHT: 168 LBS | SYSTOLIC BLOOD PRESSURE: 118 MMHG | RESPIRATION RATE: 18 BRPM | HEIGHT: 68 IN | BODY MASS INDEX: 25.46 KG/M2

## 2023-09-28 DIAGNOSIS — H93.A3 PULSATILE TINNITUS OF BOTH EARS: ICD-10-CM

## 2023-09-28 DIAGNOSIS — M54.2 NECK PAIN, CHRONIC: ICD-10-CM

## 2023-09-28 DIAGNOSIS — R20.2 PARESTHESIA OF LEFT UPPER EXTREMITY: ICD-10-CM

## 2023-09-28 DIAGNOSIS — G89.29 NECK PAIN, CHRONIC: ICD-10-CM

## 2023-09-28 DIAGNOSIS — G43.009 MIGRAINE WITHOUT AURA AND WITHOUT STATUS MIGRAINOSUS, NOT INTRACTABLE: Primary | ICD-10-CM

## 2023-09-28 DIAGNOSIS — R20.2 PARESTHESIA OF RIGHT UPPER EXTREMITY: ICD-10-CM

## 2023-09-28 NOTE — PROGRESS NOTES
Neurology Consult Note    [facility]  [address], [address]  [place] [address]  Phone: [phone]  Fax: [phone]    Referring Provider:   No ref. provider found  Primary Care Provider:  Marita Ford APRN    Reason for Consult:  Migraine follow-up  Subjective        Adelita Ferrell presents to [facility]    History of Present Illness    Very pleasant 48-year-old female seen for follow-up of migraine.  She last follow-up with Dr. Tello on 3/23/2023.  At that time patient reported significant improvement with Emgality.  We attempted to get Nurtec for abortive therapy however insurance denied.  Patient was started on Maxalt for .    Today patient presents by herself.  She reports overall her migraines have improved.  The Maxalt does help decrease the intensity however she does continue with headaches.  She will take over-the-counter ibuprofen at times.  No signs of medication overuse.  She also describes her headaches is not as intense with limited nausea.  She has cut out soft drinks however she does drink several cups of coffee daily.  She also has began yoga and exercise.  She does report having loud sounds in her head, described as loud bangs.  She will hear these in her ears will pop this and will go away.  She did see a chiropractor and had her neck adjusted approximately 8 weeks ago.  She does complain of chronic neck pain.  She did have a neck injury approximately 20 years ago where she was thrown from a horse.  She does get dizzy with turning of her head.  Changing positions can also make her dizzy.  She also reports both of her arms will go numb this can happen when she lays in the bed.    There is no problem list on file for this patient.       Past Medical History:   Diagnosis Date    Hypertension     Migraine         Social History     Socioeconomic History    Marital status:    Tobacco Use    Smoking status: Former     Types:  "Cigarettes    Smokeless tobacco: Never   Vaping Use    Vaping Use: Never used   Substance and Sexual Activity    Alcohol use: Yes     Comment: Occasionally 2 xs months maybe    Drug use: Never    Sexual activity: Yes     Partners: Male     Birth control/protection: Tubal ligation     Comment: W ....        No Known Allergies       Current Outpatient Medications:     galcanezumab-gnlm (EMGALITY) 120 MG/ML auto-injector pen, Inject 1 mL under the skin into the appropriate area as directed Every 30 (Thirty) Days., Disp: 1.12 mL, Rfl: 11    rizatriptan (Maxalt) 10 MG tablet, Take 1 tablet by mouth 1 (One) Time As Needed for Migraine. May repeat in 2 hours if needed, Disp: 9 tablet, Rfl: 0       Objective   Vital Signs:   /72 (BP Location: Left arm, Patient Position: Sitting)   Pulse 68   Resp 18   Ht 172.7 cm (68\")   Wt 76.2 kg (168 lb)   BMI 25.54 kg/m²       Physical Exam  Vitals and nursing note reviewed.   Constitutional:       General: She is not in acute distress.     Appearance: She is normal weight. She is not ill-appearing.   HENT:      Head: Normocephalic.      Right Ear: Hearing, tympanic membrane, ear canal and external ear normal.      Left Ear: Hearing, tympanic membrane, ear canal and external ear normal.   Eyes:      General: Lids are normal.      Extraocular Movements: Extraocular movements intact.      Pupils: Pupils are equal, round, and reactive to light.   Pulmonary:      Effort: Pulmonary effort is normal. No respiratory distress.   Musculoskeletal:      Cervical back: Pain with movement present. No spinous process tenderness or muscular tenderness. Decreased range of motion.   Skin:     General: Skin is warm and dry.      Capillary Refill: Capillary refill takes less than 2 seconds.   Neurological:      Mental Status: She is alert.      Motor: Motor strength is normal.      Deep Tendon Reflexes: Reflexes are normal and symmetric.   Psychiatric:         Mood and Affect: Mood " normal.         Speech: Speech normal.         Behavior: Behavior normal.         Thought Content: Thought content normal.         Judgment: Judgment normal.      Neurological Exam  Mental Status  Alert. Oriented to person, place, time and situation. Speech is normal. Language is fluent with no aphasia.    Cranial Nerves  CN II: Visual fields full to confrontation.  CN III, IV, VI: Extraocular movements intact bilaterally. Normal lids and orbits bilaterally. Pupils equal round and reactive to light bilaterally. No nystagmus.  Negative Oscar-Hallpike maneuver..  CN VII: Full and symmetric facial movement.  CN XII: Tongue midline without atrophy or fasciculations.    Motor  Normal muscle bulk throughout. No fasciculations present. Normal muscle tone. No abnormal involuntary movements. Strength is 5/5 throughout all four extremities.    Sensory  Light touch is normal in upper and lower extremities.     Reflexes  Deep tendon reflexes are 2+ and symmetric in all four extremities.    Gait  Casual gait is normal including stance, stride, and arm swing.    Result Review :   The following data was reviewed by: ALISSA Thrasher on 09/28/2023:       Progress Notes by Cristine Tello MD (03/23/2023 15:00)                  Impression:  Adelita Ferrell is a 48 y.o. female who presents for follow-up of migraine.  For migraine standpoint she is stable.  We will continue Emgality and Maxalt.  For her complaints of her neck, I do have concerns of a vascular injury versus musculoskeletal injury.  I do want to proceed with a MRI of her C-spine to assess for any cervical spine issues that may be contributing to the symptoms.  Additionally I will proceed with a CTA head and neck to assess for carotid dissection, stenosis or possible aneurysm due to her complaints.    Diagnoses and all orders for this visit:    1. Migraine without aura and without status migrainosus, not intractable (Primary)  -     MRI Cervical Spine Without  Contrast; Future  -     CT Angiogram Carotids; Future  -     CT Angiogram Head; Future    2. Paresthesia of right upper extremity  -     MRI Cervical Spine Without Contrast; Future  -     CT Angiogram Carotids; Future  -     CT Angiogram Head; Future    3. Paresthesia of left upper extremity  -     MRI Cervical Spine Without Contrast; Future  -     CT Angiogram Carotids; Future  -     CT Angiogram Head; Future    4. Neck pain, chronic  -     MRI Cervical Spine Without Contrast; Future    5. Pulsatile tinnitus of both ears  -     CT Angiogram Carotids; Future  -     CT Angiogram Head; Future        Plan:  Continue Emgality 120 mg monthly injection for migraine prevention  Continue rizatriptan 10 mg tablet as needed for migraine   MRI C-spine  CTA head  CTA neck  Follow-up in 3 months    The patient and I have discussed the plan of care and she is in full agreement at this time.     Follow Up   Return in about 3 months (around 2023) for Migraine.            Nomi Voss, ALISSA  23  12:02 CDT

## 2023-10-31 ENCOUNTER — HOSPITAL ENCOUNTER (OUTPATIENT)
Dept: MRI IMAGING | Facility: HOSPITAL | Age: 48
Discharge: HOME | End: 2023-10-31
Payer: COMMERCIAL

## 2023-10-31 ENCOUNTER — HOSPITAL ENCOUNTER (OUTPATIENT)
Dept: CT IMAGING | Facility: HOSPITAL | Age: 48
Discharge: HOME | End: 2023-10-31
Payer: COMMERCIAL

## 2023-10-31 DIAGNOSIS — R20.2 PARESTHESIA OF RIGHT UPPER EXTREMITY: ICD-10-CM

## 2023-10-31 DIAGNOSIS — G43.009 MIGRAINE WITHOUT AURA AND WITHOUT STATUS MIGRAINOSUS, NOT INTRACTABLE: ICD-10-CM

## 2023-10-31 DIAGNOSIS — M54.2 NECK PAIN, CHRONIC: ICD-10-CM

## 2023-10-31 DIAGNOSIS — R20.2 PARESTHESIA OF LEFT UPPER EXTREMITY: ICD-10-CM

## 2023-10-31 DIAGNOSIS — G89.29 NECK PAIN, CHRONIC: ICD-10-CM

## 2023-10-31 DIAGNOSIS — H93.A3 PULSATILE TINNITUS OF BOTH EARS: ICD-10-CM

## 2023-10-31 PROCEDURE — 70496 CT ANGIOGRAPHY HEAD: CPT

## 2023-10-31 PROCEDURE — 25510000001 IOPAMIDOL PER 1 ML

## 2023-10-31 PROCEDURE — 72141 MRI NECK SPINE W/O DYE: CPT

## 2023-10-31 PROCEDURE — 70498 CT ANGIOGRAPHY NECK: CPT

## 2023-10-31 RX ADMIN — IOPAMIDOL 100 ML: 755 INJECTION, SOLUTION INTRAVENOUS at 10:24

## 2023-11-01 NOTE — PROGRESS NOTES
My chart message sent to patient after trying to call once. Findings below would recommend a evaluation by neurosurgery and physical therapy. Await patient response.

## 2023-11-03 DIAGNOSIS — R20.2 ARM PARESTHESIA, LEFT: ICD-10-CM

## 2023-11-03 DIAGNOSIS — R20.2 ARM PARESTHESIA, RIGHT: ICD-10-CM

## 2023-11-03 DIAGNOSIS — G89.29 CHRONIC NECK PAIN: ICD-10-CM

## 2023-11-03 DIAGNOSIS — R93.7 ABNORMAL MRI, CERVICAL SPINE: Primary | ICD-10-CM

## 2023-11-03 DIAGNOSIS — M54.2 CHRONIC NECK PAIN: ICD-10-CM

## 2023-11-03 DIAGNOSIS — G43.009 MIGRAINE WITHOUT AURA AND WITHOUT STATUS MIGRAINOSUS, NOT INTRACTABLE: ICD-10-CM

## 2023-11-14 ENCOUNTER — OFFICE VISIT (OUTPATIENT)
Dept: NEUROSURGERY | Facility: CLINIC | Age: 48
End: 2023-11-14
Payer: COMMERCIAL

## 2023-11-14 VITALS — HEIGHT: 68 IN | WEIGHT: 167 LBS | BODY MASS INDEX: 25.31 KG/M2

## 2023-11-14 DIAGNOSIS — M50.30 DDD (DEGENERATIVE DISC DISEASE), CERVICAL: Primary | ICD-10-CM

## 2023-11-14 PROCEDURE — 99204 OFFICE O/P NEW MOD 45 MIN: CPT | Performed by: PHYSICIAN ASSISTANT

## 2023-11-14 RX ORDER — SERTRALINE HYDROCHLORIDE 25 MG/1
TABLET, FILM COATED ORAL
COMMUNITY
End: 2023-11-14

## 2023-11-14 RX ORDER — LOSARTAN POTASSIUM 25 MG/1
TABLET ORAL EVERY 24 HOURS
COMMUNITY
Start: 2023-09-21

## 2023-11-14 RX ORDER — CYCLOBENZAPRINE HCL 5 MG
5 TABLET ORAL NIGHTLY PRN
Qty: 30 TABLET | Refills: 0 | Status: SHIPPED | OUTPATIENT
Start: 2023-11-14

## 2023-11-14 RX ORDER — CETIRIZINE HYDROCHLORIDE 10 MG/1
TABLET ORAL EVERY 24 HOURS
COMMUNITY
Start: 2023-07-31 | End: 2023-11-14

## 2023-11-14 RX ORDER — CHLORTHALIDONE 25 MG/1
TABLET ORAL
COMMUNITY
Start: 2023-07-31 | End: 2023-11-14

## 2023-11-14 RX ORDER — PREDNISONE 10 MG/1
1 TABLET ORAL EVERY 12 HOURS SCHEDULED
COMMUNITY
Start: 2023-07-24 | End: 2023-11-14

## 2023-11-14 RX ORDER — EPINEPHRINE 0.3 MG/.3ML
INJECTION SUBCUTANEOUS
COMMUNITY
Start: 2023-08-10

## 2023-11-14 NOTE — PROGRESS NOTES
"    Chief complaint:   Chief Complaint   Patient presents with    Neck Pain     New patient here for evaluation.       Subjective     HPI: Adelita presents for evaluation of chronic neck pain.  Symptoms started about 20 years ago after a whiplash sort of injury while horseback riding.  She states that she is dealt with the issue for several years but is having more increasing neck stiffness and limited range of motion.  She describes the stiffness is constant and does interfere with her sleep at night.  At one point she was having some parascapular pain but denies any presently.  She does not have any radiating pain to the upper extremities and denies focal weakness and paresthesias.  She is currently utilizing Tylenol for pain.  She stopped seeing a chiropractor about 5 months ago and is scheduled to start physical therapy later on this week.    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  She denies tobacco abuse.    Review of Systems     Past Medical History:   Diagnosis Date    Hypertension     Migraine      Past Surgical History:   Procedure Laterality Date     SECTION  2002    ENDOMETRIAL ABLATION      KNEE CARTILAGE SURGERY Right     TUBAL ABDOMINAL LIGATION       Family History   Problem Relation Age of Onset    No Known Problems Mother     No Known Problems Father      Social History     Tobacco Use    Smoking status: Former     Types: Cigarettes     Passive exposure: Never    Smokeless tobacco: Never   Vaping Use    Vaping Use: Never used   Substance Use Topics    Alcohol use: Yes     Comment: Occasionally 2 xs months maybe    Drug use: Never     (Not in a hospital admission)    Allergies:  Patient has no known allergies.    Objective      Vital Signs  Ht 172.7 cm (68\")   Wt 75.8 kg (167 lb)   BMI 25.39 kg/m²     Physical Exam  Constitutional:       Appearance: Normal appearance. She is well-developed.   HENT:      Head: Normocephalic.   Eyes:      General: Lids are normal.      " Conjunctiva/sclera: Conjunctivae normal.      Pupils: Pupils are equal, round, and reactive to light.   Neck:      Comments: Limited left lateral rotation.  Cardiovascular:      Rate and Rhythm: Normal rate and regular rhythm.   Pulmonary:      Effort: Pulmonary effort is normal.      Breath sounds: Normal breath sounds.   Musculoskeletal:         General: Normal range of motion.      Cervical back: Tenderness present.   Skin:     General: Skin is warm.   Neurological:      Mental Status: She is alert and oriented to person, place, and time.      GCS: GCS eye subscore is 4. GCS verbal subscore is 5. GCS motor subscore is 6.      Cranial Nerves: Cranial nerves 2-12 are intact. No cranial nerve deficit.      Sensory: No sensory deficit.      Gait: Gait is intact.      Deep Tendon Reflexes: Reflexes are normal and symmetric. Reflexes normal.      Reflex Scores:       Tricep reflexes are 2+ on the right side and 2+ on the left side.       Bicep reflexes are 2+ on the right side and 2+ on the left side.       Brachioradialis reflexes are 2+ on the right side and 2+ on the left side.       Patellar reflexes are 2+ on the right side and 2+ on the left side.       Achilles reflexes are 2+ on the right side and 2+ on the left side.  Psychiatric:         Speech: Speech normal.         Behavior: Behavior normal.         Thought Content: Thought content normal.         Neurologic Exam     Mental Status   Oriented to person, place, and time.   Speech: speech is normal   Level of consciousness: alert  Knowledge: good.     Cranial Nerves   Cranial nerves II through XII intact.     CN III, IV, VI   Pupils are equal, round, and reactive to light.    Motor Exam   Muscle bulk: normal  Overall muscle tone: normal    Strength   Right neck flexion: 5/5  Left neck flexion: 5/5  Right neck extension: 5/5  Left neck extension: 5/5  Right deltoid: 5/5  Left deltoid: 5/5  Right biceps: 5/5  Left biceps: 5/5  Right triceps: 5/5  Left triceps:  5/5  Right wrist flexion: 5/5  Left wrist flexion: 5/5  Right wrist extension: 5/5  Left wrist extension: 5/5  Right interossei: 5/5  Left interossei: 5/5  Right abdominals: 5/5  Left abdominals: 5/5    Sensory Exam   Light touch normal.     Gait, Coordination, and Reflexes     Gait  Gait: normal    Reflexes   Right brachioradialis: 2+  Left brachioradialis: 2+  Right biceps: 2+  Left biceps: 2+  Right triceps: 2+  Left triceps: 2+  Right patellar: 2+  Left patellar: 2+  Right achilles: 2+  Left achilles: 2+  Right : 2+  Left : 2+  Right Welch: absent  Left Welch: absent  Right ankle clonus: absent  Left ankle clonus: absent      Imaging review: MRI of the cervical spine was reviewed and demonstrates loss of lordosis with focal kyphosis C5-7.  There is moderate to severe disc degeneration C5-6 and C6-7.  There is mild neuroforaminal stenosis bilaterally at C5-6 and moderate neuroforaminal stenosis bilaterally at C6-7.  There is some ligamentum flavum hypertrophy at C6-7 without significant central stenosis.  No cord signal change or lesions throughout.        Assessment/Plan: I reviewed images in detail with Adelita.  She is reporting posterior neck pain with persistent stiffness.  She is neurologically stable.  She has disc degeneration C5-7 with focal kyphosis.  She is scheduled to start physical therapy later on this week and I would like to see how this helps with her pain.    Since she does have some nights when pain interferes with her sleep, I did give her Flexeril 5 mg to utilize as needed at bedtime.    We discussed avoidance of aggravating activities as well as protective body mechanics.  She will especially avoid overhead lifting and excessive neck hypertension.    I would like to see her back after she completes physical therapy.  If she is still having significant pain, we will get her set up with pain management referral.  I look forward to seeing her at her next visit.    I advised the  patient to call and return sooner for new or worsening complaints of weakness, paresthesias, gait disturbances, or any additional concerns.  Treatment options discussed in detail with Adelita and the patient voiced understanding.  Ms. Ferrell agrees with this plan of care.     Patient is a nonsmoker    The patient's Body mass index is 25.39 kg/m².. BMI is above normal parameters. Recommendations include: educational material    Diagnoses and all orders for this visit:    1. DDD (degenerative disc disease), cervical (Primary)  -     cyclobenzaprine (FLEXERIL) 5 MG tablet; Take 1 tablet by mouth At Night As Needed for Muscle Spasms (MUSCLE SPASM).  Dispense: 30 tablet; Refill: 0          I discussed the patients findings and my recommendations with patient    Tess Cook PA-C  11/14/23  11:00 CST

## 2023-11-15 ENCOUNTER — PATIENT ROUNDING (BHMG ONLY) (OUTPATIENT)
Dept: NEUROSURGERY | Facility: CLINIC | Age: 48
End: 2023-11-15
Payer: COMMERCIAL

## 2023-12-07 DIAGNOSIS — G43.719 INTRACTABLE CHRONIC MIGRAINE WITHOUT AURA AND WITHOUT STATUS MIGRAINOSUS: ICD-10-CM

## 2023-12-07 RX ORDER — GALCANEZUMAB 120 MG/ML
120 INJECTION, SOLUTION SUBCUTANEOUS
Qty: 1 ML | Refills: 11 | Status: SHIPPED | OUTPATIENT
Start: 2023-12-07

## 2023-12-28 ENCOUNTER — OFFICE VISIT (OUTPATIENT)
Dept: NEUROLOGY | Facility: CLINIC | Age: 48
End: 2023-12-28
Payer: COMMERCIAL

## 2023-12-28 VITALS
SYSTOLIC BLOOD PRESSURE: 120 MMHG | HEART RATE: 67 BPM | OXYGEN SATURATION: 98 % | BODY MASS INDEX: 24.55 KG/M2 | HEIGHT: 68 IN | WEIGHT: 162 LBS | DIASTOLIC BLOOD PRESSURE: 76 MMHG

## 2023-12-28 DIAGNOSIS — G43.719 INTRACTABLE CHRONIC MIGRAINE WITHOUT AURA AND WITHOUT STATUS MIGRAINOSUS: ICD-10-CM

## 2023-12-28 RX ORDER — RIZATRIPTAN BENZOATE 10 MG/1
10 TABLET ORAL ONCE AS NEEDED
Qty: 9 TABLET | Refills: 3 | Status: SHIPPED | OUTPATIENT
Start: 2023-12-28

## 2023-12-28 NOTE — PROGRESS NOTES
Neurology Consult Note    [facility]  [address], [address]  [place] [address]  Phone: [phone]  Fax: [phone]    Referring Provider:   No ref. provider found  Primary Care Provider:  Marita Colunga APRN    Reason for Consult:  Migraine  Subjective      Adelita Ferrell presents to [facility]    History of Present Illness  48-year-old female seen for follow-up of chronic migraine.  Last seen in clinic on 2023.  At that time we had continued her Emgality and rizatriptan.  She had ordered MRI of her C-spine as well as CTA head and neck due to having chiropractic adjustments and concern for vascular injury.  Imaging was performed which revealed a normal CTA head and neck.  MRI cervical spine did show multilevel changes.  I did refer patient to neurosurgery for an evaluation.  They recommend continuing physical therapy.    Today patient presents by herself.  She reports a significant improvement in her migraine frequency.  Since last seen she has not reported to migraines.  She has taken Maxalt twice which does help with .  Additionally she is taking magnesium and zinc supplements.  She is doing physical therapy through [facility].  She has reported some minor headaches that respond to Tylenol.  Patient Active Problem List   Diagnosis    DDD (degenerative disc disease), cervical        Past Medical History:   Diagnosis Date    Hypertension     Migraine         Social History     Socioeconomic History    Marital status:    Tobacco Use    Smoking status: Former     Types: Cigarettes     Passive exposure: Never    Smokeless tobacco: Never   Vaping Use    Vaping Use: Never used   Substance and Sexual Activity    Alcohol use: Yes     Comment: Occasionally 2 xs months maybe    Drug use: Never    Sexual activity: Yes     Partners: Male     Birth control/protection: Tubal ligation     Comment: W ....        No Known Allergies  "      Current Outpatient Medications:     cyclobenzaprine (FLEXERIL) 5 MG tablet, Take 1 tablet by mouth At Night As Needed for Muscle Spasms (MUSCLE SPASM)., Disp: 30 tablet, Rfl: 0    EPINEPHrine (EPIPEN) 0.3 MG/0.3ML solution auto-injector injection, USE AS DIRECTED FOR ANAPHYLAXIS. GO TO ER IF USED, Disp: , Rfl:     galcanezumab-gnlm (Emgality) 120 MG/ML auto-injector pen, Inject 1 mL under the skin into the appropriate area as directed Every 30 (Thirty) Days., Disp: 1 mL, Rfl: 11    losartan (COZAAR) 25 MG tablet, Daily., Disp: , Rfl:     rizatriptan (Maxalt) 10 MG tablet, Take 1 tablet by mouth 1 (One) Time As Needed for Migraine. May repeat in 2 hours if needed, Disp: 9 tablet, Rfl: 3       Objective   Vital Signs:   /76   Pulse 67   Ht 172.7 cm (68\")   Wt 73.5 kg (162 lb)   SpO2 98%   BMI 24.63 kg/m²       Physical Exam  Vitals and nursing note reviewed.   Constitutional:       General: She is not in acute distress.     Appearance: She is not ill-appearing.   HENT:      Head: Normocephalic.   Eyes:      Extraocular Movements: Extraocular movements intact.      Pupils: Pupils are equal, round, and reactive to light.   Cardiovascular:      Pulses: Normal pulses.   Pulmonary:      Effort: Pulmonary effort is normal. No respiratory distress.   Skin:     General: Skin is warm and dry.   Neurological:      Mental Status: She is alert.   Psychiatric:         Speech: Speech normal.        Neurological Exam  Mental Status  Alert. Oriented to person, place, time and situation. Speech is normal. Language is fluent with no aphasia.    Cranial Nerves  CN II: Visual fields full to confrontation.  CN III, IV, VI: Extraocular movements intact bilaterally. Pupils equal round and reactive to light bilaterally.  CN VII: Full and symmetric facial movement.    Motor    Moves all extremities equally.    Gait  Casual gait is normal including stance, stride, and arm swing.      Result Review :   The following data was " reviewed by: ALISSA Thrasher on 12/28/2023:       MRI Cervical Spine Without Contrast (10/31/2023 09:46)  Narrative & Impression   EXAMINATION:  MRI CERVICAL SPINE WO CONTRAST-  10/31/2023 8:52 AM CDT     HISTORY: Neck pain, chronic. Migraine headaches. Bilateral upper  extremity paresthesia.     COMPARISON: No comparison study.     TECHNIQUE: Multiplanar imaging was performed.     BONE MARROW AND SPINAL CORD:  There is normal signal within the  visualized vertebral bodies and spinal cord.     DISC LEVELS:     C2-3: The disc is maintained in height. There is no spinal or foraminal  stenosis.     C3-4: The disc is maintained in height. There is mild facet arthropathy.  There is no spinal or foraminal stenosis.     C4-5: There is mild disc narrowing. There is spondylitic and uncinate  spurring. There is mild facet arthropathy. There is no significant  spinal or foraminal stenosis.     C5-6: There is severe disc narrowing with spondylitic and uncinate  spurring. There is minimal dural sac compression. There is no  significant central spinal canal narrowing. There is mild foraminal  narrowing bilaterally.     C6-7: There is severe disc narrowing with spondylitic and uncinates  spurring producing dural sac compression. There is mild narrowing of the  central canal. The central canal measures about 8 mm in AP dimension in  the midline. There is moderate to severe foraminal narrowing left  greater than right.     C7-T1: There is mild disc narrowing. There is uncinate spurring right  greater than left. The facet joints are maintained. There is mild  foraminal narrowing bilaterally.        IMPRESSION:  Abnormalities are noted at multiple disc levels and most  severe at C5-6 and C6-7. Please see the above report.        This report was signed and finalized on 10/31/2023 10:15 AM CDT by Dr. Henrique Shi MD.     CT Angiogram Carotids (10/31/2023 10:13)  Narrative & Impression   Exam: CT ANGIOGRAM CAROTIDS- 10/31/2023  9:57 AM CDT     HISTORY: Carotid artery dissection suspected     DOSE LENGTH PRODUCT: 354 mGy cm. Automated exposure control was also  utilized to decrease patient radiation dose.     Exam: Axial CT angiogram of the neck after the uneventful administration  of Isovue IV contrast. Sagittal and coronal reformations were also  provided for review. 3D images were created on an independent  workstation to better evaluate potential vascular abnormalities.     Comparison: None.     Findings:     Right carotid system: No flow-limiting stenosis.     Left carotid system: No flow-limiting stenosis.     Right vertebral: No flow-limiting stenosis.     Left vertebral: No flow-limiting stenosis.     OTHER:     Lung apices: Clear.     Soft tissues: No gross abnormality.     Osseous structures: No acute or suspicious osseous finding.     Other: None.     IMPRESSION:  Impression:     No dissection or flow-limiting stenosis.     This report was signed and finalized on 10/31/2023 12:09 PM CDT by  Jose Alberto Miller.     CT Angiogram Head (10/31/2023 10:25)  Narrative & Impression   EXAM: CT ANGIOGRAM HEAD- - 10/31/2023 9:57 AM CDT     HISTORY: Dizziness, non-specific       COMPARISON: None.      DOSE LENGTH PRODUCT: 354 mGy cm. Automated exposure control was also  utilized to decrease patient radiation dose.     TECHNIQUE: CT head without contrast was performed with coronal and  sagittal reformations. CTA head was then performed with intravenous  contrast. 3D postprocessing, including MIPs, performed and images saved  to PACS. AI ANALYSIS OF LVO WAS UTILIZED.  Grading of carotid stenosis  performed with NASCET criteria.     FINDINGS:      CT head:     Ventricles and extra-axial CSF spaces are normal in size. No abnormal  intracranial hemorrhage no abnormal brain attenuation. Sella/parasellar  structures are grossly unremarkable. No tonsillar ectopia. Orbits are  grossly unremarkable. Paranasal sinuses are clear. Mastoid air cells  are  clear. No suspicious calvarial or extracranial soft tissue abnormality.     CTA HEAD:     Distal ICAs: No flow-limiting stenosis.     MCAs: No flow-limiting stenosis.     ACAs: No flow-limiting stenosis.     Intracranial vertebrals: No flow-limiting stenosis.     Basilar: No flow-limiting stenosis.     PCAs: No flow-limiting stenosis. Fetal left PCA        OTHER:     Brain: No gross abnormality.     Orbits: No gross abnormality.     Soft tissues: No gross abnormality.     Osseous structures: No acute or suspicious osseous finding.     Other: None.     IMPRESSION:     No flow-limiting stenosis, dissection, or aneurysm.     No acute intracranial abnormality.        This report was signed and finalized on 10/31/2023 12:15 PM CDT by  Jose Alberto Miller.   Progress Notes by Nomi Voss APRN (2023 11:00)  Progress Notes by Tess Cook PA-C (2023 10:30)                 Impression:  Adelita Ferrell is a 48 y.o. female who presents for follow-up of chronic migraine.  She is stable with her preventative treatment of Emgality monthly injections as well as rizatriptan for abortive therapy.  Additionally recommend her continuing physical therapy on her cervical spine.  No further recommendations or changes.    Diagnoses and all orders for this visit:    1. Intractable chronic migraine without aura and without status migrainosus  -     rizatriptan (Maxalt) 10 MG tablet; Take 1 tablet by mouth 1 (One) Time As Needed for Migraine. May repeat in 2 hours if needed  Dispense: 9 tablet; Refill: 3        Plan:  Continue Emgality 120 mg monthly injection for migraine prophylaxis  Continue rizatriptan 10 mg tablet for migraine   Continue physical therapy  Follow-up with neurosurgery as scheduled  Follow-up with PCP as scheduled  Follow-up with me in 6 months or sooner if needed    The patient and I have discussed the plan of care and she is in full agreement at this time.     Follow Up   Return in about  6 months (around 6/28/2024) for Migraine.            Nomi Voss, APRN  12/28/23  10:44 CST

## 2024-01-16 ENCOUNTER — OFFICE VISIT (OUTPATIENT)
Dept: NEUROSURGERY | Facility: CLINIC | Age: 49
End: 2024-01-16
Payer: COMMERCIAL

## 2024-01-16 VITALS — WEIGHT: 167.8 LBS | BODY MASS INDEX: 25.43 KG/M2 | HEIGHT: 68 IN

## 2024-01-16 DIAGNOSIS — M50.30 DDD (DEGENERATIVE DISC DISEASE), CERVICAL: Primary | ICD-10-CM

## 2024-01-16 DIAGNOSIS — Z78.9 NONSMOKER: ICD-10-CM

## 2024-01-16 DIAGNOSIS — E66.3 OVERWEIGHT WITH BODY MASS INDEX (BMI) OF 25 TO 25.9 IN ADULT: ICD-10-CM

## 2024-01-16 PROCEDURE — 99213 OFFICE O/P EST LOW 20 MIN: CPT | Performed by: PHYSICIAN ASSISTANT

## 2024-01-16 NOTE — PROGRESS NOTES
"    Chief complaint:   Chief Complaint   Patient presents with    Neck Pain     Pt states having some pain. Doing some PT states feels like its helping.         Subjective     HPI: Adelita comes in for recheck today.  She has participated in physical therapy and indicates that her cervical pain is improved by at least 50%.  She does continue to have significant stiffness but feels that her pain is improved.  She denies any radiating pain to the upper extremities as well as focal weakness and numbness and tingling.  She has a few more therapy sessions left.  She got her Flexeril filled but has not had to utilize it.  She continues on her regular exercise regimen of yoga, Pilates and weight training.    Review of Systems      Objective      Vital Signs  Ht 172.7 cm (67.99\")   Wt 76.1 kg (167 lb 12.8 oz)   BMI 25.52 kg/m²     Physical Exam  Constitutional:       Appearance: Normal appearance. She is well-developed.   HENT:      Head: Normocephalic.   Eyes:      Pupils: Pupils are equal, round, and reactive to light.   Cardiovascular:      Rate and Rhythm: Normal rate and regular rhythm.   Pulmonary:      Effort: Pulmonary effort is normal.   Musculoskeletal:         General: Normal range of motion.      Cervical back: Normal range of motion.   Skin:     General: Skin is warm.   Neurological:      Mental Status: She is alert and oriented to person, place, and time.      GCS: GCS eye subscore is 4. GCS verbal subscore is 5. GCS motor subscore is 6.      Cranial Nerves: No cranial nerve deficit.      Sensory: No sensory deficit.      Motor: No weakness.      Coordination: Coordination normal.      Gait: Gait is intact. Gait normal.      Deep Tendon Reflexes: Reflexes are normal and symmetric.   Psychiatric:         Speech: Speech normal.         Behavior: Behavior normal.         Thought Content: Thought content normal.         Neurologic Exam     Mental Status   Oriented to person, place, and time.   Speech: speech is " normal   Level of consciousness: alert  Knowledge: good.     Cranial Nerves     CN III, IV, VI   Pupils are equal, round, and reactive to light.    Motor Exam   Muscle bulk: normal  Overall muscle tone: normal    Strength   Right biceps: 5/5  Left biceps: 5/5  Right triceps: 5/5  Left triceps: 5/5  Right wrist flexion: 5/5  Right wrist extension: 5/5  Left wrist extension: 5/5  Right interossei: 5/5  Left interossei: 5/5    Sensory Exam   Light touch normal.     Gait, Coordination, and Reflexes     Gait  Gait: normal    Reflexes   Right : 2+  Left : 2+      Imaging review: MRI of the cervical spine was again reviewed and demonstrates loss of lordosis with focal kyphosis C5-7.  There is moderate to severe disc degeneration C5-6 and C6-7 with mild neuroforaminal stenosis bilaterally at C5-6 and moderate bilateral neuroforaminal stenosis at C6-7.  No high-grade central stenosis throughout.        Assessment/Plan: Adelita continues with some neck stiffness but is doing better having dissipated in physical therapy.  She is neurologically stable.  She has significant disc degeneration C5-7.  She is not interested in pursuing surgery.  We discussed possibility of injections which she would like to consider before proceeding.    We discussed avoidance of aggravating activities as well as protective body mechanics.    I think we can see her back on an as-needed basis but I did encourage her to call promptly should she have any sudden increase pain, focal weakness or other issues or concerns.      Patient is a nonsmoker    The patient's Body mass index is 25.52 kg/m².. BMI is above normal parameters. Recommendations include: educational material    Diagnoses and all orders for this visit:    1. DDD (degenerative disc disease), cervical (Primary)    2. Overweight with body mass index (BMI) of 25 to 25.9 in adult    3. Nonsmoker        I discussed the patients findings and my recommendations with patient  Tess ANDERSON  SOUMYA Cook  01/16/24  09:42 CST

## 2024-01-16 NOTE — PATIENT INSTRUCTIONS
Finish PT and transition to home exercises.  Avoid aggravating activities as discussed  Follow-up here at any time if you have any focal weakness, increased pain or other issues or concerns.

## 2024-02-20 ENCOUNTER — OFFICE VISIT (OUTPATIENT)
Dept: OBSTETRICS AND GYNECOLOGY | Age: 49
End: 2024-02-20
Payer: COMMERCIAL

## 2024-02-20 VITALS
DIASTOLIC BLOOD PRESSURE: 80 MMHG | BODY MASS INDEX: 25.16 KG/M2 | WEIGHT: 166 LBS | HEIGHT: 68 IN | SYSTOLIC BLOOD PRESSURE: 124 MMHG

## 2024-02-20 DIAGNOSIS — N81.6 CYSTOCELE WITH RECTOCELE: ICD-10-CM

## 2024-02-20 DIAGNOSIS — Z12.31 ENCOUNTER FOR SCREENING MAMMOGRAM FOR BREAST CANCER: ICD-10-CM

## 2024-02-20 DIAGNOSIS — K59.00 CONSTIPATION, UNSPECIFIED CONSTIPATION TYPE: ICD-10-CM

## 2024-02-20 DIAGNOSIS — Z87.891 FORMER SMOKER: ICD-10-CM

## 2024-02-20 DIAGNOSIS — N81.10 CYSTOCELE WITH RECTOCELE: ICD-10-CM

## 2024-02-20 DIAGNOSIS — Z01.419 WELL WOMAN EXAM WITH ROUTINE GYNECOLOGICAL EXAM: Primary | ICD-10-CM

## 2024-02-20 DIAGNOSIS — Z12.4 ENCOUNTER FOR SCREENING FOR CERVICAL CANCER: ICD-10-CM

## 2024-02-20 PROCEDURE — 87624 HPV HI-RISK TYP POOLED RSLT: CPT

## 2024-02-20 PROCEDURE — 87661 TRICHOMONAS VAGINALIS AMPLIF: CPT

## 2024-02-20 PROCEDURE — 87625 HPV TYPES 16 & 18 ONLY: CPT

## 2024-02-20 NOTE — PROGRESS NOTES
"Subjective     Adelita Ferrell is a 48 y.o. female    History of Present Illness  The patient presents to [facility] and here today for her annual well woman examination. The patient reports periods remain inconsistent with the longest gap between cycles being 6 months. She states despite her ablation, cycles have remained inconsistent. She is experiencing vasomotor symptoms as well, mostly night sweats. Additionally, she has noticed constipation becoming an issue with use of enemas and stool softeners starting in 2023 and again in 2024.    Gynecologic Exam  The patient's pertinent negatives include no genital itching, genital lesions, genital odor, genital rash, missed menses, pelvic pain, vaginal bleeding or vaginal discharge. This is a new problem. The current episode started more than 1 month ago. The problem occurs intermittently. The problem has been unchanged. The patient is experiencing no pain. Associated symptoms include constipation. Pertinent negatives include no abdominal pain, anorexia, back pain, chills, diarrhea, discolored urine, dysuria, fever, flank pain, frequency, headaches, hematuria, joint pain, joint swelling, nausea, painful intercourse, rash, sore throat, urgency or vomiting. She is sexually active. No, her partner does not have an STD. She uses tubal ligation for contraception. Her menstrual history has been irregular. Her past medical history is significant for a  section. The maximum temperature recorded prior to her arrival was no fever.     /80 (BP Location: Left arm, Patient Position: Sitting, Cuff Size: Adult)   Ht 172.7 cm (68\")   Wt 75.3 kg (166 lb)   LMP 2024   BMI 25.24 kg/m²     Outpatient Encounter Medications as of 2024   Medication Sig Dispense Refill    cyclobenzaprine (FLEXERIL) 5 MG tablet Take 1 tablet by mouth At Night As Needed for Muscle Spasms (MUSCLE SPASM). 30 tablet 0    EPINEPHrine (EPIPEN) 0.3 MG/0.3ML solution " auto-injector injection USE AS DIRECTED FOR ANAPHYLAXIS. GO TO ER IF USED      galcanezumab-gnlm (Emgality) 120 MG/ML auto-injector pen Inject 1 mL under the skin into the appropriate area as directed Every 30 (Thirty) Days. 1 mL 11    losartan (COZAAR) 25 MG tablet Daily.      rizatriptan (Maxalt) 10 MG tablet Take 1 tablet by mouth 1 (One) Time As Needed for Migraine. May repeat in 2 hours if needed (Patient not taking: Reported on 2024) 9 tablet 3     No facility-administered encounter medications on file as of 2024.       Past Medical History  Past Medical History:   Diagnosis Date    Cervical disc disorder 20 yrs ago    Hypertension     Migraine        Surgical History  Past Surgical History:   Procedure Laterality Date     SECTION      ENDOMETRIAL ABLATION      KNEE CARTILAGE SURGERY Right     TUBAL ABDOMINAL LIGATION         Family History  Family History   Problem Relation Age of Onset    No Known Problems Mother     No Known Problems Father        The following portions of the patient's history were reviewed and updated as appropriate: allergies, current medications, past family history, past medical history, past social history, past surgical history, and problem list.    Review of Systems   Constitutional: Negative.  Negative for chills and fever.   HENT: Negative.  Negative for sore throat.    Eyes: Negative.    Respiratory: Negative.     Cardiovascular: Negative.    Gastrointestinal:  Positive for constipation. Negative for abdominal pain, anorexia, diarrhea, nausea and vomiting.   Endocrine: Negative.    Genitourinary:  Positive for urinary incontinence. Negative for breast discharge, breast lump, breast pain, dyspareunia, dysuria, flank pain, frequency, hematuria, missed menses, pelvic pain, urgency and vaginal discharge.   Musculoskeletal: Negative.  Negative for back pain and joint pain.   Skin: Negative.  Negative for rash.   Allergic/Immunologic: Negative.    Neurological:  Negative.    Hematological: Negative.    Psychiatric/Behavioral: Negative.       Objective   Physical Exam  Vitals and nursing note reviewed. Exam conducted with a chaperone present.   Constitutional:       General: She is not in acute distress.     Appearance: She is well-developed. She is not diaphoretic.   HENT:      Head: Normocephalic.      Right Ear: External ear normal.      Left Ear: External ear normal.      Nose: Nose normal.   Eyes:      General: No scleral icterus.        Right eye: No discharge.         Left eye: No discharge.      Conjunctiva/sclera: Conjunctivae normal.      Pupils: Pupils are equal, round, and reactive to light.   Neck:      Thyroid: No thyromegaly.      Vascular: No carotid bruit.      Trachea: No tracheal deviation.   Cardiovascular:      Rate and Rhythm: Normal rate and regular rhythm.      Heart sounds: Normal heart sounds. No murmur heard.  Pulmonary:      Effort: Pulmonary effort is normal. No respiratory distress.      Breath sounds: Normal breath sounds. No wheezing.   Chest:   Breasts:     Breasts are symmetrical.      Right: Normal. No swelling, bleeding, inverted nipple, mass, nipple discharge, skin change or tenderness.      Left: Normal. No swelling, bleeding, inverted nipple, mass, nipple discharge, skin change or tenderness.   Abdominal:      General: Abdomen is flat. Bowel sounds are normal. There is no distension.      Palpations: Abdomen is soft. There is no mass.      Tenderness: There is no abdominal tenderness. There is no right CVA tenderness, left CVA tenderness or guarding.      Hernia: No hernia is present. There is no hernia in the left inguinal area or right inguinal area.   Genitourinary:     General: Normal vulva.      Exam position: Lithotomy position.      Labia:         Right: No rash, tenderness, lesion or injury.         Left: No rash, tenderness, lesion or injury.       Vagina: Normal. No signs of injury and foreign body. No vaginal discharge,  erythema, tenderness or bleeding.      Cervix: Normal.      Uterus: Normal. Not enlarged, not fixed and not tender.       Adnexa: Right adnexa normal and left adnexa normal.        Right: No mass, tenderness or fullness.          Left: No mass, tenderness or fullness.        Rectum: Normal. No mass.      Comments: BSU normal  Urethral meatus  Normal  Perineum  Normal  Grade II-III cystocele with rectocele noted with pelvic examination  Musculoskeletal:         General: No tenderness. Normal range of motion.      Cervical back: Normal range of motion and neck supple.   Lymphadenopathy:      Head:      Right side of head: No submental, submandibular, tonsillar, preauricular, posterior auricular or occipital adenopathy.      Left side of head: No submental, submandibular, tonsillar, preauricular, posterior auricular or occipital adenopathy.      Cervical: No cervical adenopathy.      Right cervical: No superficial, deep or posterior cervical adenopathy.     Left cervical: No superficial, deep or posterior cervical adenopathy.      Upper Body:      Right upper body: No supraclavicular, axillary or pectoral adenopathy.      Left upper body: No supraclavicular, axillary or pectoral adenopathy.      Lower Body: No right inguinal adenopathy. No left inguinal adenopathy.   Skin:     General: Skin is warm and dry.      Findings: No bruising, erythema or rash.   Neurological:      Mental Status: She is alert and oriented to person, place, and time.      Coordination: Coordination normal.   Psychiatric:         Mood and Affect: Mood normal.         Behavior: Behavior normal.         Thought Content: Thought content normal.         Judgment: Judgment normal.       PHQ-9 Depression Screening  Little interest or pleasure in doing things? 0-->not at all   Feeling down, depressed, or hopeless? 0-->not at all   Trouble falling or staying asleep, or sleeping too much?     Feeling tired or having little energy?     Poor appetite or  overeating?     Feeling bad about yourself - or that you are a failure or have let yourself or your family down?     Trouble concentrating on things, such as reading the newspaper or watching television?     Moving or speaking so slowly that other people could have noticed? Or the opposite - being so fidgety or restless that you have been moving around a lot more than usual?     Thoughts that you would be better off dead, or of hurting yourself in some way?     PHQ-9 Total Score 0   If you checked off any problems, how difficult have these problems made it for you to do your work, take care of things at home, or get along with other people?          Assessment & Plan   Diagnoses and all orders for this visit:    1. Well woman exam with routine gynecological exam (Primary)  Comments:  The patient presents to [facility] today for her annual well woman examination. The patient is established with her pcp and annual labs are due at this time. She states she will be making an appointment to see her pcp in the near future.     2. Encounter for screening for cervical cancer  Comments:  The patient is sexually active with her  and declines std screening today. Her last cervical cancer screening pap smear was completed on 1/24/2023 and was normal. We did discuss ASCCP guidelines and her previous results. She requests pap smear to be completed with this office visit today. BV panel added to pap smear.   Orders:  -     Liquid-based Pap Smear, Screening    3. Encounter for screening mammogram for breast cancer  Comments:  The patient's mammogram was completed last year at [facility] and she wishes to have this completed at [facility]. I have placed the order for this to be scheduled and completed in the near future.   Orders:  -     Mammo screening digital tomosynthesis bilateral w CAD; Future    4. Cystocele with rectocele  Comments:  The patient experiences daily LEO mostly with exercising, sneezing,  laughing, etc. In November 2023 she started experiencing constipation with a second round of this noted in January 2024. She states that she has noticed stool does not come out as easily as before and sometimes has to do some positioning to help with bowel movement. In November, she did have to use an enema to provide relief and she has started taking daily stool softeners. I did suggest maybe using a cap full of Miralax three times a week and increasing fiber to prevent constipation. I included information regarding kegel exercises and the patient is agreeable to try pelvic floor physical therapy to help improve symptoms. She will return in 3 months to see Dr. Valenzuela for further evaluation.   Orders:  -     Ambulatory Referral to Physical Therapy Pelvic Floor    5. Constipation, unspecified constipation type  Comments:  The patient will be due for her screening colonoscopy she thought possibly this year and will follow up on scheduling this. She has started to incorporate use of daily stool softeners along with a healthy diet to help relieve constipation symptoms.     6. Former smoker      GYN exam completed today. Encouraged SBE. We discussed how to do self breast exam and the importance of same. Colonoscopy will be scheduled. Mammogram will be scheduled. Pap smear is done per ASCCP guidelines. HPV is done. Lab work up will be scheduled.         Carolina Rowley, APRN  2/20/2024

## 2024-02-20 NOTE — PATIENT INSTRUCTIONS
Preventive Care 40-64 Years Old, Female  Preventive care refers to lifestyle choices and visits with your health care provider that can promote health and wellness. Preventive care visits are also called wellness exams.  What can I expect for my preventive care visit?  Counseling  Your health care provider may ask you questions about your:  Medical history, including:  Past medical problems.  Family medical history.  Pregnancy history.  Current health, including:  Menstrual cycle.  Method of birth control.  Emotional well-being.  Home life and relationship well-being.  Sexual activity and sexual health.  Lifestyle, including:  Alcohol, nicotine or tobacco, and drug use.  Access to firearms.  Diet, exercise, and sleep habits.  Work and work environment.  Sunscreen use.  Safety issues such as seatbelt and bike helmet use.  Physical exam  Your health care provider will check your:  Height and weight. These may be used to calculate your BMI (body mass index). BMI is a measurement that tells if you are at a healthy weight.  Waist circumference. This measures the distance around your waistline. This measurement also tells if you are at a healthy weight and may help predict your risk of certain diseases, such as type 2 diabetes and high blood pressure.  Heart rate and blood pressure.  Body temperature.  Skin for abnormal spots.  What immunizations do I need?    Vaccines are usually given at various ages, according to a schedule. Your health care provider will recommend vaccines for you based on your age, medical history, and lifestyle or other factors, such as travel or where you work.  What tests do I need?  Screening  Your health care provider may recommend screening tests for certain conditions. This may include:  Lipid and cholesterol levels.  Diabetes screening. This is done by checking your blood sugar (glucose) after you have not eaten for a while (fasting).  Pelvic exam and Pap test.  Hepatitis B test.  Hepatitis C  test.  HIV (human immunodeficiency virus) test.  STI (sexually transmitted infection) testing, if you are at risk.  Lung cancer screening.  Colorectal cancer screening.  Mammogram. Talk with your health care provider about when you should start having regular mammograms. This may depend on whether you have a family history of breast cancer.  BRCA-related cancer screening. This may be done if you have a family history of breast, ovarian, tubal, or peritoneal cancers.  Bone density scan. This is done to screen for osteoporosis.  Talk with your health care provider about your test results, treatment options, and if necessary, the need for more tests.  Follow these instructions at home:  Eating and drinking    Eat a diet that includes fresh fruits and vegetables, whole grains, lean protein, and low-fat dairy products.  Take vitamin and mineral supplements as recommended by your health care provider.  Do not drink alcohol if:  Your health care provider tells you not to drink.  You are pregnant, may be pregnant, or are planning to become pregnant.  If you drink alcohol:  Limit how much you have to 0-1 drink a day.  Know how much alcohol is in your drink. In the [place] one drink equals one 12 oz bottle of beer (355 mL), one 5 oz glass of wine (148 mL), or one 1½ oz glass of hard liquor (44 mL).  Lifestyle  Brush your teeth every morning and night with fluoride toothpaste. Floss one time each day.  Exercise for at least 30 minutes 5 or more days each week.  Do not use any products that contain nicotine or tobacco. These products include cigarettes, chewing tobacco, and vaping devices, such as e-cigarettes. If you need help quitting, ask your health care provider.  Do not use drugs.  If you are sexually active, practice safe sex. Use a condom or other form of protection to prevent STIs.  If you do not wish to become pregnant, use a form of birth control. If you plan to become pregnant, see your health care provider for a  prepregnancy visit.  Take aspirin only as told by your health care provider. Make sure that you understand how much to take and what form to take. Work with your health care provider to find out whether it is safe and beneficial for you to take aspirin daily.  Find healthy ways to manage stress, such as:  Meditation, yoga, or listening to music.  Journaling.  Talking to a trusted person.  Spending time with friends and family.  Minimize exposure to UV radiation to reduce your risk of skin cancer.  Safety  Always wear your seat belt while driving or riding in a vehicle.  Do not drive:  If you have been drinking alcohol. Do not ride with someone who has been drinking.  When you are tired or distracted.  While texting.  If you have been using any mind-altering substances or drugs.  Wear a helmet and other protective equipment during sports activities.  If you have firearms in your house, make sure you follow all gun safety procedures.  Seek help if you have been physically or sexually abused.  What's next?  Visit your health care provider once a year for an annual wellness visit.  Ask your health care provider how often you should have your eyes and teeth checked.  Stay up to date on all vaccines.  This information is not intended to replace advice given to you by your health care provider. Make sure you discuss any questions you have with your health care provider.  Document Revised: 06/15/2022 Document Reviewed: 06/15/2022  [employer] Patient Education © 2023 [employer]     Kegel Exercises    Kegel exercises can help strengthen your pelvic floor muscles. The pelvic floor is a group of muscles that support your rectum, small intestine, and bladder. In females, pelvic floor muscles also help support the uterus. These muscles help you control the flow of urine and stool (feces).  Kegel exercises are painless and simple. They do not require any equipment. Your provider may suggest Kegel exercises to:  Improve bladder  and bowel control.  Improve sexual response.  Improve weak pelvic floor muscles after surgery to remove the uterus (hysterectomy) or after pregnancy, in females.  Improve weak pelvic floor muscles after prostate gland removal or surgery, in males.  Kegel exercises involve squeezing your pelvic floor muscles. These are the same muscles you squeeze when you try to stop the flow of urine or keep from passing gas. The exercises can be done while sitting, standing, or lying down, but it is best to vary your position.  Ask your health care provider which exercises are safe for you. Do exercises exactly as told by your health care provider and adjust them as directed. Do not begin these exercises until told by your health care provider.  Exercises  How to do Kegel exercises:  Squeeze your pelvic floor muscles tight. You should feel a tight lift in your rectal area. If you are a female, you should also feel a tightness in your vaginal area. Keep your stomach, buttocks, and legs relaxed.  Hold the muscles tight for up to 10 seconds.  Breathe normally.  Relax your muscles for up to 10 seconds.  Repeat as told by your health care provider.  Repeat this exercise daily as told by your health care provider. Continue to do this exercise for at least 4-6 weeks, or for as long as told by your health care provider.  You may be referred to a physical therapist who can help you learn more about how to do Kegel exercises.  Depending on your condition, your health care provider may recommend:  Varying how long you squeeze your muscles.  Doing several sets of exercises every day.  Doing exercises for several weeks.  Making Kegel exercises a part of your regular exercise routine.  This information is not intended to replace advice given to you by your health care provider. Make sure you discuss any questions you have with your health care provider.  Document Revised: 04/28/2022 Document Reviewed: 04/28/2022  [employer] Patient Education ©  2024 [employer]

## 2024-02-22 LAB — TRICHOMONAS VAGINALIS PCR: NOT DETECTED

## 2024-02-26 LAB
GEN CATEG CVX/VAG CYTO-IMP: NORMAL
HPV I/H RISK 4 DNA CVX QL PROBE+SIG AMP: DETECTED
HPV16 DNA SPEC QL NAA+PROBE: NOT DETECTED
HPV18+45 E6+E7 MRNA CVX QL NAA+PROBE: NOT DETECTED
LAB AP CASE REPORT: NORMAL
LAB AP GYN ADDITIONAL INFORMATION: NORMAL
LAB AP GYN OTHER FINDINGS: NORMAL
Lab: NORMAL
PATH INTERP SPEC-IMP: NORMAL
STAT OF ADQ CVX/VAG CYTO-IMP: NORMAL

## 2024-03-04 ENCOUNTER — OFFICE VISIT (OUTPATIENT)
Dept: OBSTETRICS AND GYNECOLOGY | Age: 49
End: 2024-03-04
Payer: COMMERCIAL

## 2024-03-04 VITALS
BODY MASS INDEX: 24.86 KG/M2 | SYSTOLIC BLOOD PRESSURE: 118 MMHG | DIASTOLIC BLOOD PRESSURE: 82 MMHG | WEIGHT: 164 LBS | HEIGHT: 68 IN

## 2024-03-04 DIAGNOSIS — N83.291 COMPLEX CYST OF RIGHT OVARY: ICD-10-CM

## 2024-03-04 DIAGNOSIS — R87.619 ENDOMETRIAL CELLS ON CERVICAL PAPANICOLAOU SMEAR INCONSISTENT WITH LAST MENSTRUAL PERIOD: Primary | ICD-10-CM

## 2024-03-04 LAB
B-HCG UR QL: NEGATIVE
EXPIRATION DATE: NORMAL
INTERNAL NEGATIVE CONTROL: NORMAL
INTERNAL POSITIVE CONTROL: NORMAL
Lab: NORMAL

## 2024-03-04 PROCEDURE — 88305 TISSUE EXAM BY PATHOLOGIST: CPT

## 2024-03-04 PROCEDURE — 99213 OFFICE O/P EST LOW 20 MIN: CPT

## 2024-03-04 PROCEDURE — 58100 BIOPSY OF UTERUS LINING: CPT

## 2024-03-04 PROCEDURE — 99459 PELVIC EXAMINATION: CPT

## 2024-03-04 PROCEDURE — 81025 URINE PREGNANCY TEST: CPT

## 2024-03-04 NOTE — PROGRESS NOTES
"Chief Complaint   Patient presents with    Follow-up     Patient here for EMB and u/s due to pap smear results.        History:  Adelita Ferrell is a 48 y.o. female who presents today for follow-up for evaluation of the above:  Endometrial biopsy for endometrial cells found on her most recent pap smear performed on 2/20/2024.        ROS:  Review of Systems   Constitutional: Negative.    HENT: Negative.     Eyes: Negative.    Respiratory: Negative.     Cardiovascular: Negative.    Gastrointestinal: Negative.    Endocrine: Negative.    Genitourinary: Negative.    Musculoskeletal: Negative.    Skin: Negative.    Allergic/Immunologic: Negative.    Neurological: Negative.    Hematological: Negative.    Psychiatric/Behavioral: Negative.         Ms. Ferrell  reports that she has quit smoking. Her smoking use included cigarettes. She has a 0.3 pack-year smoking history. She has never been exposed to tobacco smoke. She has never used smokeless tobacco. She reports that she does not currently use alcohol. She reports that she does not use drugs.      Current Outpatient Medications:     cyclobenzaprine (FLEXERIL) 5 MG tablet, Take 1 tablet by mouth At Night As Needed for Muscle Spasms (MUSCLE SPASM)., Disp: 30 tablet, Rfl: 0    EPINEPHrine (EPIPEN) 0.3 MG/0.3ML solution auto-injector injection, USE AS DIRECTED FOR ANAPHYLAXIS. GO TO ER IF USED, Disp: , Rfl:     galcanezumab-gnlm (Emgality) 120 MG/ML auto-injector pen, Inject 1 mL under the skin into the appropriate area as directed Every 30 (Thirty) Days., Disp: 1 mL, Rfl: 11    losartan (COZAAR) 25 MG tablet, Daily., Disp: , Rfl:     rizatriptan (Maxalt) 10 MG tablet, Take 1 tablet by mouth 1 (One) Time As Needed for Migraine. May repeat in 2 hours if needed, Disp: 9 tablet, Rfl: 3      OBJECTIVE:  /82 (BP Location: Left arm, Patient Position: Sitting, Cuff Size: Adult)   Ht 172.7 cm (68\")   Wt 74.4 kg (164 lb)   LMP 02/16/2024   BMI 24.94 kg/m²    Physical " Exam  Vitals and nursing note reviewed. Exam conducted with a chaperone present.   Constitutional:       Appearance: She is well-developed.   HENT:      Head: Normocephalic and atraumatic.   Abdominal:      General: There is no distension.      Palpations: Abdomen is soft.      Tenderness: There is no abdominal tenderness.   Genitourinary:     General: Normal vulva.      Exam position: Lithotomy position.      Labia:         Right: No rash, tenderness, lesion or injury.         Left: No rash, tenderness, lesion or injury.       Vagina: Normal. No vaginal discharge, erythema or tenderness.      Cervix: No cervical motion tenderness, discharge or friability.      Uterus: Deviated (arcuate uterus on ultrasound). Not enlarged and not tender.       Adnexa:         Right: No mass, tenderness or fullness.          Left: No mass, tenderness or fullness.     Skin:     General: Skin is warm and dry.   Neurological:      Mental Status: She is alert and oriented to person, place, and time.   Psychiatric:         Behavior: Behavior normal.         Thought Content: Thought content normal.         Judgment: Judgment normal.       Assessment/Plan    Diagnoses and all orders for this visit:    1. Endometrial cells on cervical Papanicolaou smear inconsistent with last menstrual period (Primary)  Comments:  The patient presents today for emb due to endometrial cells in a woman >=45 years of age on her most recent pap smear performed on 2/20/2024. Endometrial lining today on ultrasound is 5.5 mm.   Orders:  -     POC Pregnancy, Urine (negative)  -     Tissue Pathology Exam    2. Complex cyst of right ovary  Comments:  Ultrasound today reveals a complex right ovarian cyst with length of 4.38 cm x width 3.57 cm x height 4.24 cm x volume 34.67 ml. Additionally, there is a small amount of endometrium visualized in the fundus. Due to complex nature of cyst, CARMEN will be ordered and drawn today. The patient will return in 4 weeks to see a  physician in the office to discuss emb results, lab work, and treatment options if indicated.   Orders:  -     Ovarian Malignancy Risk-CARMEN; Future          An After Visit Summary was printed and given to the patient at discharge.  Return in about 4 weeks (around 4/1/2024) for with MD office visit, with OV and U/S. Sooner if problems arise.          An endometrial biopsy was performed in the office today.  Urine Pregnancy test was negative. The cervix was visualized with a speculum and prepped with Betadine.  The anterior lip was grasped with a tenaculum and a standard endometrial sampling Pipelle was passed into the uterine cavity.  The patient experienced mild cramping and the uterus sounded to 6 cm.  An adequate amount of tissue was obtained with 1 pass.  The tenaculum was removed and the site was hemostatic.  She tolerated the procedure well.    ALISSA Muniz

## 2024-03-05 LAB
CANCER AG125 SERPL-ACNC: 11.3 U/ML (ref 0–38.1)
HE4 SERPL-SCNC: 48.6 PMOL/L (ref 0–63.6)
LABORATORY COMMENT REPORT: NORMAL
POSTMENOPAUSAL INTERP: LOW: NORMAL
PREMENOPAUSAL INTERP: LOW: NORMAL
ROMA SCORE POSTMEN SERPL: 0.93
ROMA SCORE PREMEN SERPL: 0.69

## 2024-03-07 ENCOUNTER — TELEPHONE (OUTPATIENT)
Dept: OBSTETRICS AND GYNECOLOGY | Age: 49
End: 2024-03-07
Payer: COMMERCIAL

## 2024-03-07 LAB
CYTO UR: NORMAL
LAB AP CASE REPORT: NORMAL
Lab: NORMAL
PATH REPORT.FINAL DX SPEC: NORMAL
PATH REPORT.GROSS SPEC: NORMAL

## 2024-03-07 NOTE — TELEPHONE ENCOUNTER
Patient contacted today to give results via phone. Patient's full name and date of birth correctly identified. Results of her endometrial biopsy do not reveal any cellular atypia or malignancy, and the CARMEN reveals low risk as well. The patient voiced she is doing well and has no further questions or concerns during the phone call.     ALISSA Toussaint

## 2024-03-11 LAB
NCCN CRITERIA FLAG: NORMAL
TYRER CUZICK SCORE: 10.2

## 2024-03-21 ENCOUNTER — APPOINTMENT (OUTPATIENT)
Dept: OTHER | Facility: HOSPITAL | Age: 49
End: 2024-03-21
Payer: COMMERCIAL

## 2024-03-21 ENCOUNTER — HOSPITAL ENCOUNTER (OUTPATIENT)
Dept: MAMMOGRAPHY | Facility: HOSPITAL | Age: 49
Discharge: HOME | End: 2024-03-21
Payer: COMMERCIAL

## 2024-03-21 DIAGNOSIS — Z12.31 ENCOUNTER FOR SCREENING MAMMOGRAM FOR BREAST CANCER: ICD-10-CM

## 2024-03-21 PROCEDURE — 77067 SCR MAMMO BI INCL CAD: CPT

## 2024-03-21 PROCEDURE — 77063 BREAST TOMOSYNTHESIS BI: CPT

## 2024-04-01 ENCOUNTER — OFFICE VISIT (OUTPATIENT)
Dept: OBSTETRICS AND GYNECOLOGY | Age: 49
End: 2024-04-01
Payer: COMMERCIAL

## 2024-04-01 VITALS
WEIGHT: 162.4 LBS | SYSTOLIC BLOOD PRESSURE: 122 MMHG | HEIGHT: 68 IN | BODY MASS INDEX: 24.61 KG/M2 | DIASTOLIC BLOOD PRESSURE: 74 MMHG

## 2024-04-01 DIAGNOSIS — N93.0 POSTCOITAL BLEEDING: ICD-10-CM

## 2024-04-01 DIAGNOSIS — N93.9 ABNORMAL UTERINE BLEEDING (AUB): ICD-10-CM

## 2024-04-01 DIAGNOSIS — Z98.890 HISTORY OF ENDOMETRIAL ABLATION: ICD-10-CM

## 2024-04-01 DIAGNOSIS — N94.6 DYSMENORRHEA: ICD-10-CM

## 2024-04-01 DIAGNOSIS — N81.10 CYSTOCELE WITH RECTOCELE: ICD-10-CM

## 2024-04-01 DIAGNOSIS — N95.1 PERIMENOPAUSAL: ICD-10-CM

## 2024-04-01 DIAGNOSIS — N81.6 CYSTOCELE WITH RECTOCELE: ICD-10-CM

## 2024-04-01 DIAGNOSIS — N39.3 SUI (STRESS URINARY INCONTINENCE, FEMALE): ICD-10-CM

## 2024-04-01 DIAGNOSIS — N92.6 IRREGULAR MENSES: Primary | ICD-10-CM

## 2024-04-01 PROCEDURE — 99214 OFFICE O/P EST MOD 30 MIN: CPT | Performed by: OBSTETRICS & GYNECOLOGY

## 2024-04-01 PROCEDURE — 99459 PELVIC EXAMINATION: CPT | Performed by: OBSTETRICS & GYNECOLOGY

## 2024-04-01 NOTE — PROGRESS NOTES
Bernardino Gonzalez MD  [facility]  [address]  [place] [address]  Office [phone]  Fax [phone]      [facility]  Adelita Ferrell  : 1975  MRN: [id number]    Subjective   Subjective     Chief Complaint   Patient presents with    Ovarian Cyst     Patient here for OV cyst ultrasound check and discussion of EMB result from 2024. EMB done due to endometrial cells on cervical pap.    3 episodes of menstrual bleeding in March- desires hysterectomy.       History of Present Illness  Adelita Ferrell is a 48 y.o. female , , who comes to the office today for consult. Irregular bleeding. Ongoing for years. Not bleeding currently. Will have days of bleeding, ranging from spotting to heavy. No warning signs of upcoming bleed. Postcoital spotting noted as well. Associated with perimenopausal symptoms. Had ultrasound today for complex ovarian cyst on the right. No pelvic pain outside of bleeding. Dysmenorrhea during cycles. Does note stress incontinence with the extremes of physical activity.  Occasional bulge symptoms as well during these extremes. Only really occurring during workouts.    Review of Systems   Genitourinary:  Positive for menstrual problem and vaginal bleeding. Negative for decreased urine volume, difficulty urinating, dyspareunia, dysuria, enuresis, flank pain, frequency, genital sores, hematuria, pelvic pain, urgency, vaginal discharge and vaginal pain.   All other systems reviewed and are negative.       Personal History     The following portions of the patient's history were reviewed and updated as appropriate: allergies, current medications, past family history, past medical history, past social history, past surgical history, and problem list.    Past Medical History:   Diagnosis Date    Cervical disc disorder 20 yrs ago    Hypertension     Migraine        Past Surgical History:   Procedure Laterality Date     SECTION      ENDOMETRIAL ABLATION    "   KNEE CARTILAGE SURGERY Right     TUBAL ABDOMINAL LIGATION         Current Outpatient Medications   Medication Instructions    Emgality 120 mg, Subcutaneous, Every 30 Days    EPINEPHrine (EPIPEN) 0.3 MG/0.3ML solution auto-injector injection USE AS DIRECTED FOR ANAPHYLAXIS. GO TO ER IF USED    losartan (COZAAR) 25 MG tablet Every 24 Hours    rizatriptan (MAXALT) 10 mg, Oral, Once As Needed, May repeat in 2 hours if needed       No Known Allergies    Social History     Socioeconomic History    Marital status:    Tobacco Use    Smoking status: Former     Current packs/day: 0.25     Average packs/day: 0.3 packs/day for 1 year (0.3 ttl pk-yrs)     Types: Cigarettes     Passive exposure: Never    Smokeless tobacco: Never   Vaping Use    Vaping status: Never Used   Substance and Sexual Activity    Alcohol use: Not Currently     Comment: Occasionally 2 xs months maybe    Drug use: Never    Sexual activity: Yes     Partners: Male     Birth control/protection: Tubal ligation     Comment: W ....       Family History   Problem Relation Age of Onset    No Known Problems Mother     No Known Problems Father        Objective    Objective     Vitals:   Visit Vitals  /74   Ht 172.7 cm (68\")   Wt 73.7 kg (162 lb 6.4 oz)   BMI 24.69 kg/m²        Physical Exam  Vitals reviewed. Exam conducted with a chaperone present.   Constitutional:       General: She is not in acute distress.     Appearance: Normal appearance. She is not ill-appearing.   HENT:      Head: Normocephalic and atraumatic.      Nose: No congestion or rhinorrhea.   Eyes:      General: No scleral icterus.        Right eye: No discharge.         Left eye: No discharge.      Extraocular Movements: Extraocular movements intact.      Conjunctiva/sclera: Conjunctivae normal.   Pulmonary:      Effort: Pulmonary effort is normal. No accessory muscle usage or respiratory distress.   Genitourinary:     General: Normal vulva.      Exam position: Lithotomy " position.      Pubic Area: No rash or pubic lice.       Labia:         Right: No rash, tenderness or lesion.         Left: No rash, tenderness or lesion.       Urethra: No prolapse or urethral lesion.      Vagina: Normal. Prolapsed vaginal walls (stage 2 cystocele/rectocele) present.      Cervix: Normal.      Uterus: Normal.       Adnexa: Right adnexa normal and left adnexa normal.      Rectum: No external hemorrhoid.          Comments: Consent for exam obtained verbally from patient.    No leakage of urine with valsalva  Musculoskeletal:      Right lower leg: No edema.      Left lower leg: No edema.   Skin:     General: Skin is warm and dry.      Coloration: Skin is not ashen, cyanotic or jaundiced.   Neurological:      General: No focal deficit present.      Mental Status: She is alert and oriented to person, place, and time.   Psychiatric:         Mood and Affect: Mood normal.         Behavior: Behavior is cooperative.             Result Review    Lab Results   Component Value Date    INTERPGYN Negative for intraepithelial lesion or malignancy 02/20/2024    HPVAPTIMA Detected (A) 02/20/2024   HPV, Aptima High 16 / 18,45 (02/20/2024 14:48)  negative  HPV DNA Probe, Direct - ThinPrep Vial, Cervix, Endocervix (02/20/2024 14:48)  detected  Liquid-based Pap Smear, Screening (02/20/2024 14:48) NILM, endometrial cells present    AMBRY GENETIC ASSESSMENT - , (03/11/2024 07:40)  Postmenopausal Interp: LOW (03/04/2024 13:52)  Premenopausal Interp: LOW (03/04/2024 13:52)  Ovarian Malignancy Risk-CARMEN (03/04/2024 13:52)   = 11.3    Tissue Pathology Exam (03/04/2024 11:57)   Endometrium, biopsy:  A.  Superficial fragments of inactive to weakly proliferative endometrium.  B.  Fragments of endocervical tissue.  C.  Blood.  D.  No evidence of atypia or malignancy.    US Non-ob Transvaginal (04/01/2024 09:15)   1.  Uterus: Enlarged, with uterine dimensions 8 x 5.9 x 6 cm, and Anteverted     2.  Endometrium:  4.9 mm, h/o  ablation noted     3.  Myometrium: Normal homogenous texture      4.  Ovaries  Left:    Normal/unremarkable   Right:  Normal/unremarkable , prior ovarian cyst has resolved. Free fluid noted near right ovary and in the cul-de-sac.    US Non-ob Transvaginal (03/04/2024 11:15)   Impression:     1.  Uterus: Normal size and possible arcuate appearance     2.  Endometrium:  5.5 mm      3.  Myometrium: Normal homogenous texture      4.  Ovaries  Left:    Normal/unremarkable   Right:  Complex cystic mass 4.4 x 3.6 cm         Assessment & Plan   Assessment / Plan     Diagnoses and all orders for this visit:    1. Irregular menses (Primary)    2. History of endometrial ablation    3. Perimenopausal    4. Dysmenorrhea    5. Postcoital bleeding    6. Cystocele with rectocele    7. LEO (stress urinary incontinence, female)    8. Abnormal uterine bleeding (AUB)          Discussion:   Irregular uterine bleeding following ablation. EMB with weakly proliferative endometrium. Cyst of the right ovary has resolved. CARMEN low risk. Reviewed labs, ultrasound findings, and exam findings with patient. Discussion held about options for her bleeding despite endometrial ablation. Endometrium still responding based on her EMB. Discussed medical and surgical options. Medical - would consider progesterone only vs low dose OCP given her history of well controlled hypertension and migraines. That said, would likely only consider POPs only. Risks/benefits of hormone replacement therapy/contraceptives discussed for cycle control including risks of breast cancer, VTE/DVT, stroke, and cardiovascular risks. Discussed expectant management with awaiting for menopause  - menopause age and timing and that her symptoms should resolve with menopause onset. Discussed surgery including hysterectomy without oophorectomy. Repeat ablation discussed but stated this may be difficult and unachievable. Her prolapse is present. She describes her stress incontinence as  minimal. Discussed sling may be an option but with minimal symptoms, I would consider conservative options first in this respect. Discussed that prolapse repair/sling may be an option in the future if she does desire. She is more concerned about the time off needed for recovery for surgery. Surgery recovery discussed including its limitations. Risks of surgery discussed as well including bleeding, infection, and injury to surrounding organs. Questions answered. She expressed understanding. She is going to discuss this further with her  and then let us know what she wants to proceed with.     35 minutes was spent with this patient, at least 80% of that time in direct face-to-face counseling about her condition/treatment options.      Follow-up: Return if symptoms worsen or fail to improve.    Bernardino Gonzalez MD

## 2024-06-27 ENCOUNTER — OFFICE VISIT (OUTPATIENT)
Dept: NEUROLOGY | Facility: CLINIC | Age: 49
End: 2024-06-27
Payer: COMMERCIAL

## 2024-06-27 VITALS
DIASTOLIC BLOOD PRESSURE: 80 MMHG | SYSTOLIC BLOOD PRESSURE: 120 MMHG | BODY MASS INDEX: 24.4 KG/M2 | HEIGHT: 68 IN | WEIGHT: 161 LBS | RESPIRATION RATE: 18 BRPM | HEART RATE: 62 BPM

## 2024-06-27 DIAGNOSIS — G43.719 INTRACTABLE CHRONIC MIGRAINE WITHOUT AURA AND WITHOUT STATUS MIGRAINOSUS: ICD-10-CM

## 2024-06-27 PROCEDURE — 99214 OFFICE O/P EST MOD 30 MIN: CPT

## 2024-06-27 RX ORDER — RIZATRIPTAN BENZOATE 10 MG/1
10 TABLET ORAL ONCE AS NEEDED
Qty: 9 TABLET | Refills: 6 | Status: SHIPPED | OUTPATIENT
Start: 2024-06-27

## 2024-06-27 NOTE — PROGRESS NOTES
Neurology Consult Note    [facility]  [address]  [place] [address]  Phone: [phone]  Fax: [phone]    Referring Provider:   No ref. provider found  Primary Care Provider:  Marita Colunga APRN    Reason for Consult:  Intractable chronic migraine  Subjective      Adelita Ferrell presents to [facility]    History of Present Illness  48-year-old female who I follow for chronic migraine.  Last seen in clinic on 2023.  At that time patient was continued on Emgality for prevention as well as rizatriptan for .  She was set for 6-month follow-up.  Of note she did see neurosurgery on 2024.  Was recommended for patient to continue conservative treatment.  She was followed up as needed.      Today patient presents with her child.  Reports me migraines are under control.  When she does have them they are associated on her migraine.  She is still responsive to Maxalt.  She has completed physical therapy.  She continues to workout and improve her overall condition.  She notes no pain with her neck or arms with exercising.  She is very pleased with her current response.    Preventative medications tried:  Emgality.  Topiramate.  Failed due to side effects    Abortive medications tried:  Sumatriptan.  Limited benefit  Rizatriptan.  Good clinical benefit    Medications contraindicated:  Antihypertensive medication class due to concurrent use of losartan with normal blood pressure in clinic today.  Heart rate 62.    Patient Active Problem List   Diagnosis    DDD (degenerative disc disease), cervical    Overweight with body mass index (BMI) of 25 to 25.9 in adult    Nonsmoker        Past Medical History:   Diagnosis Date    Cervical disc disorder 20 yrs ago    Hypertension     Migraine         Social History     Socioeconomic History    Marital status:    Tobacco Use    Smoking status: Former     Current packs/day: 0.25     Average  "packs/day: 0.3 packs/day for 1 year (0.3 ttl pk-yrs)     Types: Cigarettes     Passive exposure: Never    Smokeless tobacco: Never   Vaping Use    Vaping status: Never Used   Substance and Sexual Activity    Alcohol use: Not Currently     Comment: Occasionally 2 xs months maybe    Drug use: Never    Sexual activity: Yes     Partners: Male     Birth control/protection: Tubal ligation     Comment: W ....        No Known Allergies       Current Outpatient Medications:     EPINEPHrine (EPIPEN) 0.3 MG/0.3ML solution auto-injector injection, USE AS DIRECTED FOR ANAPHYLAXIS. GO TO ER IF USED, Disp: , Rfl:     galcanezumab-gnlm (Emgality) 120 MG/ML auto-injector pen, Inject 1 mL under the skin into the appropriate area as directed Every 30 (Thirty) Days., Disp: 1 mL, Rfl: 11    losartan (COZAAR) 25 MG tablet, Daily., Disp: , Rfl:     rizatriptan (Maxalt) 10 MG tablet, Take 1 tablet by mouth 1 (One) Time As Needed for Migraine. May repeat in 2 hours if needed, Disp: 9 tablet, Rfl: 6       Objective   Vital Signs:   /80 (BP Location: Left arm, Patient Position: Sitting)   Pulse 62   Resp 18   Ht 172.7 cm (68\")   Wt 73 kg (161 lb)   BMI 24.48 kg/m²       Physical Exam  Vitals and nursing note reviewed.   Constitutional:       Appearance: Normal appearance.   HENT:      Head: Normocephalic.   Eyes:      General: Lids are normal.      Extraocular Movements: Extraocular movements intact.      Pupils: Pupils are equal, round, and reactive to light.   Pulmonary:      Effort: Pulmonary effort is normal. No respiratory distress.   Skin:     General: Skin is warm and dry.   Neurological:      Mental Status: She is alert.      Motor: Motor strength is normal.  Psychiatric:         Speech: Speech normal.        Neurological Exam  Mental Status  Alert. Oriented to person, place, time and situation. Speech is normal. Language is fluent with no aphasia.    Cranial Nerves  CN III, IV, VI: Extraocular movements intact " bilaterally. Normal lids and orbits bilaterally. Pupils equal round and reactive to light bilaterally.  CN VII: Full and symmetric facial movement.  CN IX, X: Palate elevates symmetrically. Normal gag reflex.  CN XII: Tongue midline without atrophy or fasciculations.    Motor   Strength is 5/5 throughout all four extremities.    Gait  Casual gait is normal including stance, stride, and arm swing.      Result Review :   The following data was reviewed by: ALISSA Thrasher on 2024:       Progress Notes by Nomi Voss APRN (2023 10:30)     Progress Notes by Tess Cook PA-C (2024 09:15)                Impression:  Adelita Ferrell is a 48 y.o. female who presents for follow-up of chronic migraine.  Patient is having a great therapeutic response to Emgality and Maxalt.  Very minimal migraine frequency at this point.  Continue to be responsive.  We will continue current regimen without any change.    Diagnoses and all orders for this visit:    1. Intractable chronic migraine without aura and without status migrainosus  -     rizatriptan (Maxalt) 10 MG tablet; Take 1 tablet by mouth 1 (One) Time As Needed for Migraine. May repeat in 2 hours if needed  Dispense: 9 tablet; Refill: 6        Plan:  Continue Emgality monthly injections for migraine prevention  Continue Maxalt as needed for migraine   Continue with good quality sleep  Avoid known triggers  Follow-up with PCP  Follow-up in our clinic in 6 months or sooner if needed    The patient and I have discussed the plan of care and she is in full agreement at this time.     Follow Up   Return in about 6 months (around 2024).            ALISSA Thrasher  24  11:07 CDT

## 2024-11-05 ENCOUNTER — TELEPHONE (OUTPATIENT)
Dept: OBSTETRICS AND GYNECOLOGY | Age: 49
End: 2024-11-05

## 2024-11-05 NOTE — TELEPHONE ENCOUNTER
Caller: Adelita Ferrell    Relationship to patient: Self    Best call back number: [phone]    Chief complaint: ONGOING BLEEDING ISSUES    Type of visit: PAP, U/S AND BIOPSY - PER DR. BRUNSON    Requested date: NEXT SOONEST AVAILABLE     If rescheduling, when is the original appointment: N/A     Additional notes:PT IS CALLED TO SCHEDULE A BIOPSY, U/S AND PAP WITH DR. BRUNSON - UNABLE TO W/T TO OFFICE FOR BIOPSY SCHEDULING    PLEASE CALL PT TO ADVISE / SCHEDULE

## 2024-11-24 DIAGNOSIS — G43.719 INTRACTABLE CHRONIC MIGRAINE WITHOUT AURA AND WITHOUT STATUS MIGRAINOSUS: ICD-10-CM

## 2024-11-25 RX ORDER — GALCANEZUMAB 120 MG/ML
INJECTION, SOLUTION SUBCUTANEOUS
Qty: 1 ML | Refills: 11 | Status: SHIPPED | OUTPATIENT
Start: 2024-11-25

## 2024-12-04 ENCOUNTER — PROCEDURE VISIT (OUTPATIENT)
Dept: OBSTETRICS AND GYNECOLOGY | Age: 49
End: 2024-12-04
Payer: COMMERCIAL

## 2024-12-04 VITALS
HEIGHT: 68 IN | WEIGHT: 160.6 LBS | BODY MASS INDEX: 24.34 KG/M2 | SYSTOLIC BLOOD PRESSURE: 126 MMHG | DIASTOLIC BLOOD PRESSURE: 84 MMHG

## 2024-12-04 DIAGNOSIS — N93.9 ABNORMAL UTERINE BLEEDING (AUB): ICD-10-CM

## 2024-12-04 DIAGNOSIS — Z11.51 SCREENING FOR HPV (HUMAN PAPILLOMAVIRUS): ICD-10-CM

## 2024-12-04 DIAGNOSIS — N92.6 IRREGULAR MENSES: Primary | ICD-10-CM

## 2024-12-04 DIAGNOSIS — Z98.890 HISTORY OF ENDOMETRIAL ABLATION: ICD-10-CM

## 2024-12-04 DIAGNOSIS — N94.6 DYSMENORRHEA: ICD-10-CM

## 2024-12-04 PROCEDURE — G0123 SCREEN CERV/VAG THIN LAYER: HCPCS | Performed by: OBSTETRICS & GYNECOLOGY

## 2024-12-04 PROCEDURE — 87624 HPV HI-RISK TYP POOLED RSLT: CPT | Performed by: OBSTETRICS & GYNECOLOGY

## 2024-12-04 PROCEDURE — 88305 TISSUE EXAM BY PATHOLOGIST: CPT | Performed by: OBSTETRICS & GYNECOLOGY

## 2024-12-04 RX ORDER — SODIUM CHLORIDE 0.9 % (FLUSH) 0.9 %
1-10 SYRINGE (ML) INJECTION AS NEEDED
OUTPATIENT
Start: 2024-12-04

## 2024-12-04 RX ORDER — METRONIDAZOLE 500 MG/100ML
500 INJECTION, SOLUTION INTRAVENOUS ONCE
OUTPATIENT
Start: 2024-12-04 | End: 2024-12-04

## 2024-12-04 RX ORDER — SODIUM CHLORIDE 9 MG/ML
100 INJECTION, SOLUTION INTRAVENOUS CONTINUOUS
OUTPATIENT
Start: 2024-12-04 | End: 2024-12-05

## 2024-12-04 RX ORDER — SODIUM CHLORIDE 0.9 % (FLUSH) 0.9 %
10 SYRINGE (ML) INJECTION EVERY 12 HOURS SCHEDULED
OUTPATIENT
Start: 2024-12-04

## 2024-12-04 RX ORDER — SODIUM CHLORIDE 9 MG/ML
40 INJECTION, SOLUTION INTRAVENOUS AS NEEDED
OUTPATIENT
Start: 2024-12-04

## 2024-12-04 NOTE — PROGRESS NOTES
Bernardino Gonzalez MD  [facility]  [address]  [place] [address]  Office [phone]  Fax [phone]      [facility]  Adelita Ferrell  : 1975  MRN: [id number]    Subjective   Subjective     Chief Complaint   Patient presents with    Procedure     Pt here for pap smear and EMB prior to scheduling surgery. U/s done in office today.       History of Present Illness  Adelita Ferrell is a 49 y.o. female , , who comes to the office today for consult for surgery. Still with menorrhagia and dysmenorrhea despite endometrial ablation. Menses sporadic. Bleeding heavy at times. LMP last week. Still bleeding today but almost done. Dysmenorrhea - main issue. Midol does help some but she reports it causes palpitations/feeling jittery. Desires definitive management in the form of hysterectomy.    Review of Systems   Genitourinary:  Positive for menstrual problem and vaginal bleeding. Negative for decreased urine volume, difficulty urinating, dyspareunia, dysuria, enuresis, flank pain, frequency, genital sores, hematuria, pelvic pain, urgency, vaginal discharge and vaginal pain.   All other systems reviewed and are negative.       Personal History     Past Medical History:   Diagnosis Date    Cervical disc disorder 20 yrs ago    Hypertension     Migraine        Past Surgical History:   Procedure Laterality Date     SECTION      ENDOMETRIAL ABLATION      KNEE CARTILAGE SURGERY Right     TUBAL ABDOMINAL LIGATION         Current Outpatient Medications   Medication Instructions    EPINEPHrine (EPIPEN) 0.3 MG/0.3ML solution auto-injector injection USE AS DIRECTED FOR ANAPHYLAXIS. GO TO ER IF USED    galcanezumab-gnlm (Emgality) 120 MG/ML auto-injector pen INJECT 1 SYRINGE SUBCUTANEOUSLY ONCE EVERY MONTH    losartan (COZAAR) 25 MG tablet Every 24 Hours    rizatriptan (MAXALT) 10 mg, Oral, Once As Needed, May repeat in 2 hours if needed       No Known Allergies    Social History  "    Socioeconomic History    Marital status:    Tobacco Use    Smoking status: Former     Current packs/day: 0.25     Average packs/day: 0.3 packs/day for 1 year (0.3 ttl pk-yrs)     Types: Cigarettes     Passive exposure: Never    Smokeless tobacco: Never   Vaping Use    Vaping status: Never Used   Substance and Sexual Activity    Alcohol use: Not Currently     Comment: Occasionally 2 xs months maybe    Drug use: Never    Sexual activity: Yes     Partners: Male     Birth control/protection: Tubal ligation     Comment: W ....       Family History   Problem Relation Age of Onset    No Known Problems Mother     No Known Problems Father        Objective    Objective     Vitals:   Visit Vitals  /84   Ht 172.7 cm (68\")   Wt 72.8 kg (160 lb 9.6 oz)   BMI 24.42 kg/m²        Physical Exam  Vitals reviewed. Exam conducted with a chaperone present.   Constitutional:       General: She is not in acute distress.     Appearance: Normal appearance. She is not ill-appearing.   HENT:      Head: Normocephalic and atraumatic.      Nose: No congestion or rhinorrhea.   Eyes:      General: No scleral icterus.        Right eye: No discharge.         Left eye: No discharge.      Extraocular Movements: Extraocular movements intact.      Conjunctiva/sclera: Conjunctivae normal.   Pulmonary:      Effort: Pulmonary effort is normal. No accessory muscle usage or respiratory distress.   Abdominal:      General: Abdomen is flat.      Palpations: Abdomen is soft.      Tenderness: There is no abdominal tenderness.      Hernia: There is no hernia in the left inguinal area or right inguinal area.   Genitourinary:     General: Normal vulva.      Exam position: Lithotomy position.      Pubic Area: No rash or pubic lice.       Labia:         Right: No rash, tenderness, lesion or injury.         Left: No rash, tenderness, lesion or injury.       Urethra: No prolapse, urethral pain, urethral swelling or urethral lesion.      Vagina: " Normal.      Cervix: Normal.   Musculoskeletal:      Right lower leg: No edema.      Left lower leg: No edema.   Lymphadenopathy:      Lower Body: No right inguinal adenopathy. No left inguinal adenopathy.   Skin:     General: Skin is warm and dry.      Coloration: Skin is not ashen, cyanotic or jaundiced.   Neurological:      General: No focal deficit present.      Mental Status: She is alert and oriented to person, place, and time.   Psychiatric:         Mood and Affect: Mood normal.         Behavior: Behavior is cooperative.         Procedure   The following procedures were performed in the clinic today:  Endometrial Biopsy    Date/Time: 12/4/2024 1:48 PM    Performed by: Bernardino Gonzalez MD  Authorized by: Bernardino Gonzalez MD    Consent:     Consent obtained: verbal    Consent given by: patient    Risks discussed: bleeding, infection, need for repeat procedure and pain    Alternatives discussed: alternative treatment    Patient agrees, verbalizes understanding, and wants to proceed: yes    Universal protocol:     Immediately prior to procedure a time out was called: yes      Patient identity confirmed: verbally with patient  Pre-procedure:     Urine pregnancy test: N/A    Procedure:     Prepped with: Betadine    Tenaculum used: no      A local block was performed: no      Cervix dilated: no      Number of passes: 3  Findings:     Cervix: normal      Uterus depth by sound (cm): 6    Specimen collected: specimen collected and sent to pathology      Patient tolerance: tolerated well, no immediate complications           Result Review    US Non-ob Transvaginal (12/04/2024 09:42)   1.  Uterus: Enlarged, with uterine dimensions 8.1 x 5.4 x 5 cm, and Anteverted     2.  Endometrium:  4.7 mm and ablation noted     3.  Myometrium: Normal homogenous texture     4.  Ovaries  Left:     Follicle noted, 23 mm, otherwise normal appearing ovary  Right:  Normal/unremarkable    Lab Results   Component Value Date    INTERPGYN  Negative for intraepithelial lesion or malignancy 02/20/2024    HPVAPTIMA Detected (A) 02/20/2024       Tissue Pathology Exam (03/04/2024 11:57)   Endometrium, biopsy:  A.  Superficial fragments of inactive to weakly proliferative endometrium.  B.  Fragments of endocervical tissue.  C.  Blood.  D.  No evidence of atypia or malignancy.        Assessment & Plan   Assessment / Plan     Diagnoses and all orders for this visit:    1. Irregular menses (Primary)  -     Case Request; Standing  -     Pregnancy, Urine - Urine, Clean Catch; Future  -     Type & Screen; Future  -     sodium chloride 0.9 % flush 10 mL  -     sodium chloride 0.9 % flush 1-10 mL  -     sodium chloride 0.9 % infusion 40 mL  -     sodium chloride 0.9 % infusion  -     ceFAZolin (ANCEF) 2,000 mg in sodium chloride 0.9 % 100 mL IVPB  -     metroNIDAZOLE (FLAGYL) IVPB 500 mg  -     Case Request  -     Liquid-based Pap Smear, Screening  -     Tissue Pathology Exam  -     Endometrial Biopsy    2. History of endometrial ablation  -     Case Request; Standing  -     Pregnancy, Urine - Urine, Clean Catch; Future  -     Type & Screen; Future  -     sodium chloride 0.9 % flush 10 mL  -     sodium chloride 0.9 % flush 1-10 mL  -     sodium chloride 0.9 % infusion 40 mL  -     sodium chloride 0.9 % infusion  -     ceFAZolin (ANCEF) 2,000 mg in sodium chloride 0.9 % 100 mL IVPB  -     metroNIDAZOLE (FLAGYL) IVPB 500 mg  -     Case Request  -     Endometrial Biopsy    3. Dysmenorrhea  -     Case Request; Standing  -     Pregnancy, Urine - Urine, Clean Catch; Future  -     Type & Screen; Future  -     sodium chloride 0.9 % flush 10 mL  -     sodium chloride 0.9 % flush 1-10 mL  -     sodium chloride 0.9 % infusion 40 mL  -     sodium chloride 0.9 % infusion  -     ceFAZolin (ANCEF) 2,000 mg in sodium chloride 0.9 % 100 mL IVPB  -     metroNIDAZOLE (FLAGYL) IVPB 500 mg  -     Case Request    4. Abnormal uterine bleeding (AUB)  -     Case Request; Standing  -      Pregnancy, Urine - Urine, Clean Catch; Future  -     Type & Screen; Future  -     sodium chloride 0.9 % flush 10 mL  -     sodium chloride 0.9 % flush 1-10 mL  -     sodium chloride 0.9 % infusion 40 mL  -     sodium chloride 0.9 % infusion  -     ceFAZolin (ANCEF) 2,000 mg in sodium chloride 0.9 % 100 mL IVPB  -     metroNIDAZOLE (FLAGYL) IVPB 500 mg  -     Case Request  -     Tissue Pathology Exam  -     Endometrial Biopsy    5. Screening for HPV (human papillomavirus)  -     Liquid-based Pap Smear, Screening    Other orders  -     Follow Anesthesia Guidelines / Protocol; Future  -     Follow Anesthesia Guidelines / Protocol; Standing  -     Clip Operative Site; Standing  -     Verify / Perform Chlorhexidine Skin Prep; Standing  -     Provide NPO Instructions to Patient; Future  -     Chlorhexidine Skin Prep; Future  -     Pregnancy, Urine - Urine, Clean Catch; Standing  -     Type & Screen; Standing  -     Insert Peripheral IV; Standing  -     Saline Lock & Maintain IV Access; Standing  -     Place Sequential Compression Device; Standing  -     Maintain Sequential Compression Device; Standing          Discussion:   Surgical options discussed for definitive management for dysmenorrhea/AUB/irregular menses in setting of prior ablation. Robot-TLH, BSG, Cystoscopy discussed. Surgery, recovery, route of surgery, and limitations postoperatively discussed. Time off reviewed. Questions answered. She expressed understanding of the above and wished to proceed. Repeat pap smear and EMB recommended today - she is agreeable. Pap smear collected today. Repeat EMB performed today to rule out pre-malignancy/cancer. Call with results.     Surgical Counseling  The patient was informed of the risks and benefits of the planned procedures. The risks included but were not limited to: bleeding, infection, injury to surrounding structures potentially including the vascular, gastrointestinal, and genitourinary systems, nerve injury,  possible blood transfusion and its associated risks, possible bilateral oophorectomy. Patient was counseled on the possibility of a laparotomy, and all other indicated procedures. We discussed the possibility of difficulties with anesthesia, potential exacerbations to other health conditions, and potential concern for chronic pain that follows any surgery. Patient expressed understanding of the risks involved. Her questions were answered to her satisfaction. No guarantees were made or implied. The patient consented to proceed with the planned procedures.    Plan for robot-TLH, BSG, cystoscopy on 1/13/2025.     Follow-up: Return in about 8 weeks (around 1/27/2025) for Post-op.    Bernardino Gonzalez MD

## 2024-12-06 LAB
CYTO UR: NORMAL
GEN CATEG CVX/VAG CYTO-IMP: ABNORMAL
HPV I/H RISK 4 DNA CVX QL PROBE+SIG AMP: NOT DETECTED
LAB AP CASE REPORT: ABNORMAL
LAB AP CASE REPORT: NORMAL
LAB AP GYN ADDITIONAL INFORMATION: ABNORMAL
Lab: ABNORMAL
Lab: NORMAL
PATH INTERP SPEC-IMP: ABNORMAL
PATH REPORT.FINAL DX SPEC: NORMAL
PATH REPORT.GROSS SPEC: NORMAL
STAT OF ADQ CVX/VAG CYTO-IMP: ABNORMAL

## 2025-01-06 ENCOUNTER — PRE-ADMISSION TESTING (OUTPATIENT)
Dept: PREADMISSION TESTING | Facility: HOSPITAL | Age: 50
End: 2025-01-06
Payer: COMMERCIAL

## 2025-01-06 VITALS
OXYGEN SATURATION: 99 % | HEART RATE: 85 BPM | RESPIRATION RATE: 16 BRPM | BODY MASS INDEX: 25.46 KG/M2 | SYSTOLIC BLOOD PRESSURE: 120 MMHG | HEIGHT: 68 IN | DIASTOLIC BLOOD PRESSURE: 83 MMHG | WEIGHT: 167.99 LBS

## 2025-01-06 DIAGNOSIS — N93.9 ABNORMAL UTERINE BLEEDING (AUB): ICD-10-CM

## 2025-01-06 DIAGNOSIS — Z98.890 HISTORY OF ENDOMETRIAL ABLATION: ICD-10-CM

## 2025-01-06 DIAGNOSIS — N92.6 IRREGULAR MENSES: ICD-10-CM

## 2025-01-06 DIAGNOSIS — N94.6 DYSMENORRHEA: ICD-10-CM

## 2025-01-06 LAB
B-HCG UR QL: NEGATIVE
DEPRECATED RDW RBC AUTO: 41.9 FL (ref 37–54)
ERYTHROCYTE [DISTWIDTH] IN BLOOD BY AUTOMATED COUNT: 12.7 % (ref 12.3–15.4)
HCT VFR BLD AUTO: 40.5 % (ref 34–46.6)
HGB BLD-MCNC: 13.4 G/DL (ref 12–15.9)
MCH RBC QN AUTO: 30.2 PG (ref 26.6–33)
MCHC RBC AUTO-ENTMCNC: 33.1 G/DL (ref 31.5–35.7)
MCV RBC AUTO: 91.2 FL (ref 79–97)
PLATELET # BLD AUTO: 254 10*3/MM3 (ref 140–450)
PMV BLD AUTO: 10.5 FL (ref 6–12)
RBC # BLD AUTO: 4.44 10*6/MM3 (ref 3.77–5.28)
WBC NRBC COR # BLD AUTO: 9.22 10*3/MM3 (ref 3.4–10.8)

## 2025-01-06 PROCEDURE — 86900 BLOOD TYPING SEROLOGIC ABO: CPT

## 2025-01-06 PROCEDURE — 81025 URINE PREGNANCY TEST: CPT

## 2025-01-06 PROCEDURE — 85027 COMPLETE CBC AUTOMATED: CPT

## 2025-01-06 PROCEDURE — 36415 COLL VENOUS BLD VENIPUNCTURE: CPT

## 2025-01-06 PROCEDURE — 86850 RBC ANTIBODY SCREEN: CPT

## 2025-01-06 PROCEDURE — 86901 BLOOD TYPING SEROLOGIC RH(D): CPT

## 2025-01-06 NOTE — DISCHARGE INSTRUCTIONS
Preparing for Surgery  Follow these instructions before the procedure:  Several days or weeks before your procedure        Ask your health care provider about:  Changing or stopping your regular medicines. This is especially important if you are taking diabetes medicines or blood thinners.  Taking medicines such as aspirin and ibuprofen. These medicines can thin your blood. Do not take these medicines unless your health care provider tells you to take them.  Taking over-the-counter medicines, vitamins, herbs, and supplements.    Contact your surgeon if you:  Develop a fever of more than 100.4°F (38°C) or other feelings of illness during the 48 hours before your surgery.  Have symptoms that get worse.  Have questions or concerns about your surgery.  If you are going home the same day of your surgery you will need to arrange for a responsible adult, age 18 years old or older, to drive you home from the hospital and stay with you for 24 hours. Verification of the  will be made prior to any procedure requiring sedation. You may not go home in a taxi or any form of public transportation by yourself.     Day before your procedure  Medication(s) you need to stop the day before your surgery: Hold Losartan for 24 Hours Prior to Surgery    24 hours before your procedure DO NOT drink alcoholic beverages or smoke.  24 hours before your procedure STOP taking Erectile Dysfunction medication (i.e.,Cialis, Viagra)   You may be asked to shower with a germ-killing soap.  Day of your procedure       8 hours before your scheduled arrival time, STOP all food, any dairy products, and full liquids. This includes hard candy, chewing gum or mints. This is extremely important to prevent serious complications.     Up to 2 hours before your scheduled arrival time, you may have clear liquids no cream, powder, or pulp of any kind. Safe options are water, black coffee, plain tea, soda, Gatorade/Powerade, clear broth, apple juice.    2 hours  before your scheduled arrival time, STOP drinking clear liquids.    You may need to take another shower with a germ-killing soap before you leave home in the morning. Do not use perfumes, colognes, or body lotions.  Wear comfortable loose-fitting clothing.  Remove all jewelry including body piercing and rings, dark colored nail polish, and make up prior to arrival at the hospital. Leave all valuables at home.   Bring your hearing aids if you rely on them.  Do not wear contact lenses. If you wear eyeglasses remember to bring a case to store them in while you are in surgery.  Do not use denture adhesives since you will be asked to remove them during your surgery.    You do not need to bring your home medications into the hospital.   Bring your sleep apnea device with you on the day of your surgery (if this applies to you).  If you have an Inspire implant for sleep apnea, please bring the remote with you on the day of surgery.  If you wear portable oxygen, bring it with you.   If you are staying overnight, you may bring a bag of items you may need such as slippers, robe and a change of clothes for your discharge. You may want to leave these items in the car until you are ready for them since your family will take your belongings when you leave the pre-operative area.  Arrive at the hospital as scheduled by the office. You will be asked to arrive 2 hours prior to your surgery time in order to prepare for your procedure.  When you arrive at the hospital  Go to the registration desk located at the main entrance of the hospital.  After registration is completed, you will be given a beeper and a sticker sheet. Take the stickers to Outpatient Surgery and place in the tray at the end of the desk to notify the staff that you have arrived and registered.   Return to the lobby to wait. You are not always called back according to the time of arrival but rather the time your doctor will be ready.  When your beeper lights up and  vibrates proceed through the double doors, under the stairs, and a member of the Outpatient Surgery staff will escort you to your preoperative room.   How to Use Chlorhexidine Before Surgery  Chlorhexidine gluconate (CHG) is a germ-killing (antiseptic) solution that is used to clean the skin. It can get rid of the bacteria that normally live on the skin and can keep them away for about 24 hours. To clean your skin with CHG, you may be given:  A CHG solution to use in the shower or as part of a sponge bath.  A prepackaged cloth that contains CHG.  Cleaning your skin with CHG may help lower the risk for infection:  While you are staying in the intensive care unit of the hospital.  If you have a vascular access, such as a central line, to provide short-term or long-term access to your veins.  If you have a catheter to drain urine from your bladder.  If you are on a ventilator. A ventilator is a machine that helps you breathe by moving air in and out of your lungs.  After surgery.  What are the risks?  Risks of using CHG include:  A skin reaction.  Hearing loss, if CHG gets in your ears and you have a perforated eardrum.  Eye injury, if CHG gets in your eyes and is not rinsed out.  The CHG product catching fire.  Make sure that you avoid smoking and flames after applying CHG to your skin.  Do not use CHG:  If you have a chlorhexidine allergy or have previously reacted to chlorhexidine.  On babies younger than 2 months of age.  How to use CHG solution  Use CHG only as told by your health care provider, and follow the instructions on the label.  Use the full amount of CHG as directed. Usually, this is one bottle.  During a shower    Follow these steps when using CHG solution during a shower (unless your health care provider gives you different instructions):  Start the shower.  Use your normal soap and shampoo to wash your face and hair.  Turn off the shower or move out of the shower stream.  Pour the CHG onto a clean  washcloth. Do not use any type of brush or rough-edged sponge.  Starting at your neck, lather your body down to your toes. Make sure you follow these instructions:  If you will be having surgery, pay special attention to the part of your body where you will be having surgery. Scrub this area for at least 1 minute.  Do not use CHG on your head or face. If the solution gets into your ears or eyes, rinse them well with water.  Avoid your genital area.  Avoid any areas of skin that have broken skin, cuts, or scrapes.  Scrub your back and under your arms. Make sure to wash skin folds.  Let the lather sit on your skin for 1-2 minutes or as long as told by your health care provider.  Thoroughly rinse your entire body in the shower. Make sure that all body creases and crevices are rinsed well.  Dry off with a clean towel. Do not put any substances on your body afterward--such as powder, lotion, or perfume--unless you are told to do so by your health care provider. Only use lotions that are recommended by the .  Put on clean clothes or pajamas.  If it is the night before your surgery, sleep in clean sheets.     During a sponge bath  Follow these steps when using CHG solution during a sponge bath (unless your health care provider gives you different instructions):  Use your normal soap and shampoo to wash your face and hair.  Pour the CHG onto a clean washcloth.  Starting at your neck, lather your body down to your toes. Make sure you follow these instructions:  If you will be having surgery, pay special attention to the part of your body where you will be having surgery. Scrub this area for at least 1 minute.  Do not use CHG on your head or face. If the solution gets into your ears or eyes, rinse them well with water.  Avoid your genital area.  Avoid any areas of skin that have broken skin, cuts, or scrapes.  Scrub your back and under your arms. Make sure to wash skin folds.  Let the lather sit on your skin for  1-2 minutes or as long as told by your health care provider.  Using a different clean, wet washcloth, thoroughly rinse your entire body. Make sure that all body creases and crevices are rinsed well.  Dry off with a clean towel. Do not put any substances on your body afterward--such as powder, lotion, or perfume--unless you are told to do so by your health care provider. Only use lotions that are recommended by the .  Put on clean clothes or pajamas.  If it is the night before your surgery, sleep in clean sheets.  How to use CHG prepackaged cloths  Only use CHG cloths as told by your health care provider, and follow the instructions on the label.  Use the CHG cloth on clean, dry skin.  Do not use the CHG cloth on your head or face unless your health care provider tells you to.  When washing with the CHG cloth:  Avoid your genital area.  Avoid any areas of skin that have broken skin, cuts, or scrapes.  Before surgery    Follow these steps when using a CHG cloth to clean before surgery (unless your health care provider gives you different instructions):  Using the CHG cloth, vigorously scrub the part of your body where you will be having surgery. Scrub using a back-and-forth motion for 3 minutes. The area on your body should be completely wet with CHG when you are done scrubbing.  Do not rinse. Discard the cloth and let the area air-dry. Do not put any substances on the area afterward, such as powder, lotion, or perfume.  Put on clean clothes or pajamas.  If it is the night before your surgery, sleep in clean sheets.     For general bathing  Follow these steps when using CHG cloths for general bathing (unless your health care provider gives you different instructions).  Use a separate CHG cloth for each area of your body. Make sure you wash between any folds of skin and between your fingers and toes. Wash your body in the following order, switching to a new cloth after each step:  The front of your neck,  shoulders, and chest.  Both of your arms, under your arms, and your hands.  Your stomach and groin area, avoiding the genitals.  Your right leg and foot.  Your left leg and foot.  The back of your neck, your back, and your buttocks.  Do not rinse. Discard the cloth and let the area air-dry. Do not put any substances on your body afterward--such as powder, lotion, or perfume--unless you are told to do so by your health care provider. Only use lotions that are recommended by the .  Put on clean clothes or pajamas.  Contact a health care provider if:  Your skin gets irritated after scrubbing.  You have questions about using your solution or cloth.  You swallow any chlorhexidine. Call your local poison control center (1-487.856.7911 in the [place]  Get help right away if:  Your eyes itch badly, or they become very red or swollen.  Your skin itches badly and is red or swollen.  Your hearing changes.  You have trouble seeing.  You have swelling or tingling in your mouth or throat.  You have trouble breathing.  These symptoms may represent a serious problem that is an emergency. Do not wait to see if the symptoms will go away. Get medical help right away. Call your local emergency services (853 in the [place] Do not drive yourself to the hospital.  Summary  Chlorhexidine gluconate (CHG) is a germ-killing (antiseptic) solution that is used to clean the skin. Cleaning your skin with CHG may help to lower your risk for infection.  You may be given CHG to use for bathing. It may be in a bottle or in a prepackaged cloth to use on your skin. Carefully follow your health care provider's instructions and the instructions on the product label.  Do not use CHG if you have a chlorhexidine allergy.  Contact your health care provider if your skin gets irritated after scrubbing.  This information is not intended to replace advice given to you by your health care provider. Make sure you discuss any questions you have with your  health care provider.  Document Revised: 04/17/2023 Document Reviewed: 02/28/2022  Elsevier Patient Education © 2023 [employer]

## 2025-01-07 LAB
ABO GROUP BLD: NORMAL
BLD GP AB SCN SERPL QL: NEGATIVE
RH BLD: POSITIVE
T&S EXPIRATION DATE: NORMAL

## 2025-01-13 ENCOUNTER — TELEPHONE (OUTPATIENT)
Dept: OBSTETRICS AND GYNECOLOGY | Age: 50
End: 2025-01-13
Payer: COMMERCIAL

## 2025-01-13 ENCOUNTER — ANESTHESIA (OUTPATIENT)
Dept: PERIOP | Facility: HOSPITAL | Age: 50
End: 2025-01-13
Payer: COMMERCIAL

## 2025-01-13 ENCOUNTER — ANESTHESIA EVENT (OUTPATIENT)
Dept: PERIOP | Facility: HOSPITAL | Age: 50
End: 2025-01-13
Payer: COMMERCIAL

## 2025-01-13 ENCOUNTER — HOSPITAL ENCOUNTER (OUTPATIENT)
Facility: HOSPITAL | Age: 50
Setting detail: HOSPITAL OUTPATIENT SURGERY
Discharge: HOME | End: 2025-01-13
Attending: OBSTETRICS & GYNECOLOGY | Admitting: OBSTETRICS & GYNECOLOGY
Payer: COMMERCIAL

## 2025-01-13 VITALS
SYSTOLIC BLOOD PRESSURE: 108 MMHG | RESPIRATION RATE: 16 BRPM | HEART RATE: 60 BPM | DIASTOLIC BLOOD PRESSURE: 71 MMHG | TEMPERATURE: 98.7 F | OXYGEN SATURATION: 93 %

## 2025-01-13 DIAGNOSIS — N94.6 DYSMENORRHEA: ICD-10-CM

## 2025-01-13 DIAGNOSIS — Z98.890 HISTORY OF ENDOMETRIAL ABLATION: ICD-10-CM

## 2025-01-13 DIAGNOSIS — N92.6 IRREGULAR MENSES: ICD-10-CM

## 2025-01-13 DIAGNOSIS — N93.9 ABNORMAL UTERINE BLEEDING (AUB): ICD-10-CM

## 2025-01-13 LAB
ABO GROUP BLD: NORMAL
B-HCG UR QL: NEGATIVE
BLD GP AB SCN SERPL QL: NEGATIVE
RH BLD: POSITIVE
T&S EXPIRATION DATE: NORMAL

## 2025-01-13 PROCEDURE — 25010000002 CEFAZOLIN PER 500 MG: Performed by: OBSTETRICS & GYNECOLOGY

## 2025-01-13 PROCEDURE — 86901 BLOOD TYPING SEROLOGIC RH(D): CPT | Performed by: OBSTETRICS & GYNECOLOGY

## 2025-01-13 PROCEDURE — 25010000002 LIDOCAINE PF 2% 2 % SOLUTION: Performed by: NURSE ANESTHETIST, CERTIFIED REGISTERED

## 2025-01-13 PROCEDURE — C1713 ANCHOR/SCREW BN/BN,TIS/BN: HCPCS | Performed by: OBSTETRICS & GYNECOLOGY

## 2025-01-13 PROCEDURE — 88307 TISSUE EXAM BY PATHOLOGIST: CPT | Performed by: OBSTETRICS & GYNECOLOGY

## 2025-01-13 PROCEDURE — 81025 URINE PREGNANCY TEST: CPT | Performed by: OBSTETRICS & GYNECOLOGY

## 2025-01-13 PROCEDURE — 25010000002 FENTANYL CITRATE (PF) 50 MCG/ML SOLUTION: Performed by: NURSE ANESTHETIST, CERTIFIED REGISTERED

## 2025-01-13 PROCEDURE — 25810000003 LACTATED RINGERS PER 1000 ML: Performed by: OBSTETRICS & GYNECOLOGY

## 2025-01-13 PROCEDURE — 25010000002 ONDANSETRON PER 1 MG: Performed by: NURSE ANESTHETIST, CERTIFIED REGISTERED

## 2025-01-13 PROCEDURE — 25010000002 MIDAZOLAM PER 1MG: Performed by: ANESTHESIOLOGY

## 2025-01-13 PROCEDURE — 86900 BLOOD TYPING SEROLOGIC ABO: CPT | Performed by: OBSTETRICS & GYNECOLOGY

## 2025-01-13 PROCEDURE — 25010000002 SUGAMMADEX 200 MG/2ML SOLUTION: Performed by: NURSE ANESTHETIST, CERTIFIED REGISTERED

## 2025-01-13 PROCEDURE — 25010000002 METRONIDAZOLE 500 MG/100ML SOLUTION: Performed by: OBSTETRICS & GYNECOLOGY

## 2025-01-13 PROCEDURE — 25010000002 DEXAMETHASONE PER 1 MG: Performed by: ANESTHESIOLOGY

## 2025-01-13 PROCEDURE — 25010000002 KETOROLAC TROMETHAMINE PER 15 MG: Performed by: NURSE ANESTHETIST, CERTIFIED REGISTERED

## 2025-01-13 PROCEDURE — 25010000002 PROPOFOL 10 MG/ML EMULSION: Performed by: NURSE ANESTHETIST, CERTIFIED REGISTERED

## 2025-01-13 PROCEDURE — 25010000002 DEXAMETHASONE PER 1 MG: Performed by: NURSE ANESTHETIST, CERTIFIED REGISTERED

## 2025-01-13 PROCEDURE — 86850 RBC ANTIBODY SCREEN: CPT | Performed by: OBSTETRICS & GYNECOLOGY

## 2025-01-13 DEVICE — ABSORBABLE WOUND CLOSURE DEVICE
Type: IMPLANTABLE DEVICE | Site: ABDOMEN | Status: FUNCTIONAL
Brand: V-LOC 180

## 2025-01-13 RX ORDER — SODIUM CHLORIDE 9 MG/ML
100 INJECTION, SOLUTION INTRAVENOUS CONTINUOUS
Status: DISCONTINUED | OUTPATIENT
Start: 2025-01-13 | End: 2025-01-13 | Stop reason: HOSPADM

## 2025-01-13 RX ORDER — LIDOCAINE HYDROCHLORIDE 20 MG/ML
INJECTION, SOLUTION EPIDURAL; INFILTRATION; INTRACAUDAL; PERINEURAL AS NEEDED
Status: DISCONTINUED | OUTPATIENT
Start: 2025-01-13 | End: 2025-01-13 | Stop reason: SURG

## 2025-01-13 RX ORDER — SODIUM CHLORIDE, SODIUM LACTATE, POTASSIUM CHLORIDE, CALCIUM CHLORIDE 600; 310; 30; 20 MG/100ML; MG/100ML; MG/100ML; MG/100ML
1000 INJECTION, SOLUTION INTRAVENOUS CONTINUOUS
Status: DISCONTINUED | OUTPATIENT
Start: 2025-01-13 | End: 2025-01-13 | Stop reason: HOSPADM

## 2025-01-13 RX ORDER — KETOROLAC TROMETHAMINE 30 MG/ML
INJECTION, SOLUTION INTRAMUSCULAR; INTRAVENOUS AS NEEDED
Status: DISCONTINUED | OUTPATIENT
Start: 2025-01-13 | End: 2025-01-13 | Stop reason: SURG

## 2025-01-13 RX ORDER — NALOXONE HCL 0.4 MG/ML
0.4 VIAL (ML) INJECTION AS NEEDED
Status: DISCONTINUED | OUTPATIENT
Start: 2025-01-13 | End: 2025-01-13 | Stop reason: HOSPADM

## 2025-01-13 RX ORDER — MAGNESIUM HYDROXIDE 1200 MG/15ML
LIQUID ORAL AS NEEDED
Status: DISCONTINUED | OUTPATIENT
Start: 2025-01-13 | End: 2025-01-13 | Stop reason: HOSPADM

## 2025-01-13 RX ORDER — BUPIVACAINE HYDROCHLORIDE AND EPINEPHRINE 5; 5 MG/ML; UG/ML
INJECTION, SOLUTION PERINEURAL AS NEEDED
Status: DISCONTINUED | OUTPATIENT
Start: 2025-01-13 | End: 2025-01-13 | Stop reason: HOSPADM

## 2025-01-13 RX ORDER — SODIUM CHLORIDE 0.9 % (FLUSH) 0.9 %
10 SYRINGE (ML) INJECTION AS NEEDED
Status: DISCONTINUED | OUTPATIENT
Start: 2025-01-13 | End: 2025-01-13 | Stop reason: HOSPADM

## 2025-01-13 RX ORDER — METRONIDAZOLE 500 MG/100ML
500 INJECTION, SOLUTION INTRAVENOUS ONCE
Status: COMPLETED | OUTPATIENT
Start: 2025-01-13 | End: 2025-01-13

## 2025-01-13 RX ORDER — MIDAZOLAM HYDROCHLORIDE 2 MG/2ML
2 INJECTION, SOLUTION INTRAMUSCULAR; INTRAVENOUS ONCE
Status: COMPLETED | OUTPATIENT
Start: 2025-01-13 | End: 2025-01-13

## 2025-01-13 RX ORDER — HYDROCODONE BITARTRATE AND ACETAMINOPHEN 10; 325 MG/1; MG/1
1 TABLET ORAL EVERY 4 HOURS PRN
Status: DISCONTINUED | OUTPATIENT
Start: 2025-01-13 | End: 2025-01-13 | Stop reason: HOSPADM

## 2025-01-13 RX ORDER — LIDOCAINE HYDROCHLORIDE 40 MG/ML
SOLUTION TOPICAL AS NEEDED
Status: DISCONTINUED | OUTPATIENT
Start: 2025-01-13 | End: 2025-01-13 | Stop reason: SURG

## 2025-01-13 RX ORDER — FENTANYL CITRATE 50 UG/ML
INJECTION, SOLUTION INTRAMUSCULAR; INTRAVENOUS AS NEEDED
Status: DISCONTINUED | OUTPATIENT
Start: 2025-01-13 | End: 2025-01-13 | Stop reason: SURG

## 2025-01-13 RX ORDER — ROCURONIUM BROMIDE 10 MG/ML
INJECTION, SOLUTION INTRAVENOUS AS NEEDED
Status: DISCONTINUED | OUTPATIENT
Start: 2025-01-13 | End: 2025-01-13 | Stop reason: SURG

## 2025-01-13 RX ORDER — ONDANSETRON 2 MG/ML
4 INJECTION INTRAMUSCULAR; INTRAVENOUS ONCE AS NEEDED
Status: DISCONTINUED | OUTPATIENT
Start: 2025-01-13 | End: 2025-01-13 | Stop reason: HOSPADM

## 2025-01-13 RX ORDER — IBUPROFEN 600 MG/1
600 TABLET, FILM COATED ORAL EVERY 6 HOURS PRN
Status: DISCONTINUED | OUTPATIENT
Start: 2025-01-13 | End: 2025-01-13 | Stop reason: HOSPADM

## 2025-01-13 RX ORDER — TRAMADOL HYDROCHLORIDE 50 MG/1
50 TABLET ORAL EVERY 8 HOURS PRN
Qty: 9 TABLET | Refills: 0 | Status: SHIPPED | OUTPATIENT
Start: 2025-01-13 | End: 2025-01-16

## 2025-01-13 RX ORDER — ONDANSETRON 4 MG/1
4 TABLET, ORALLY DISINTEGRATING ORAL EVERY 8 HOURS PRN
Qty: 21 TABLET | Refills: 0 | Status: SHIPPED | OUTPATIENT
Start: 2025-01-13

## 2025-01-13 RX ORDER — SCOLOPAMINE TRANSDERMAL SYSTEM 1 MG/1
1 PATCH, EXTENDED RELEASE TRANSDERMAL ONCE
Status: DISCONTINUED | OUTPATIENT
Start: 2025-01-13 | End: 2025-01-13 | Stop reason: HOSPADM

## 2025-01-13 RX ORDER — IBUPROFEN 600 MG/1
600 TABLET, FILM COATED ORAL EVERY 6 HOURS PRN
Qty: 40 TABLET | Refills: 0 | Status: SHIPPED | OUTPATIENT
Start: 2025-01-13

## 2025-01-13 RX ORDER — ONDANSETRON 2 MG/ML
INJECTION INTRAMUSCULAR; INTRAVENOUS AS NEEDED
Status: DISCONTINUED | OUTPATIENT
Start: 2025-01-13 | End: 2025-01-13 | Stop reason: SURG

## 2025-01-13 RX ORDER — SODIUM CHLORIDE 0.9 % (FLUSH) 0.9 %
1-10 SYRINGE (ML) INJECTION AS NEEDED
Status: DISCONTINUED | OUTPATIENT
Start: 2025-01-13 | End: 2025-01-13 | Stop reason: HOSPADM

## 2025-01-13 RX ORDER — HYDROCODONE BITARTRATE AND ACETAMINOPHEN 5; 325 MG/1; MG/1
1 TABLET ORAL EVERY 4 HOURS PRN
Status: DISCONTINUED | OUTPATIENT
Start: 2025-01-13 | End: 2025-01-13 | Stop reason: HOSPADM

## 2025-01-13 RX ORDER — DEXTROSE MONOHYDRATE 25 G/50ML
12.5 INJECTION, SOLUTION INTRAVENOUS AS NEEDED
Status: DISCONTINUED | OUTPATIENT
Start: 2025-01-13 | End: 2025-01-13 | Stop reason: HOSPADM

## 2025-01-13 RX ORDER — SODIUM CHLORIDE 0.9 % (FLUSH) 0.9 %
10 SYRINGE (ML) INJECTION EVERY 12 HOURS SCHEDULED
Status: DISCONTINUED | OUTPATIENT
Start: 2025-01-13 | End: 2025-01-13 | Stop reason: HOSPADM

## 2025-01-13 RX ORDER — SODIUM CHLORIDE 0.9 % (FLUSH) 0.9 %
3 SYRINGE (ML) INJECTION AS NEEDED
Status: DISCONTINUED | OUTPATIENT
Start: 2025-01-13 | End: 2025-01-13 | Stop reason: HOSPADM

## 2025-01-13 RX ORDER — MIDAZOLAM HYDROCHLORIDE 2 MG/2ML
1 INJECTION, SOLUTION INTRAMUSCULAR; INTRAVENOUS
Status: DISCONTINUED | OUTPATIENT
Start: 2025-01-13 | End: 2025-01-13 | Stop reason: HOSPADM

## 2025-01-13 RX ORDER — FAMOTIDINE 10 MG/ML
20 INJECTION, SOLUTION INTRAVENOUS
Status: DISCONTINUED | OUTPATIENT
Start: 2025-01-13 | End: 2025-01-13 | Stop reason: HOSPADM

## 2025-01-13 RX ORDER — SENNOSIDES 8.6 MG
650 CAPSULE ORAL EVERY 8 HOURS PRN
Status: ON HOLD | COMMUNITY
End: 2025-01-13

## 2025-01-13 RX ORDER — DEXAMETHASONE SODIUM PHOSPHATE 4 MG/ML
4 INJECTION, SOLUTION INTRA-ARTICULAR; INTRALESIONAL; INTRAMUSCULAR; INTRAVENOUS; SOFT TISSUE ONCE AS NEEDED
Status: COMPLETED | OUTPATIENT
Start: 2025-01-13 | End: 2025-01-13

## 2025-01-13 RX ORDER — BUPIVACAINE HCL/0.9 % NACL/PF 0.125 %
PLASTIC BAG, INJECTION (ML) EPIDURAL AS NEEDED
Status: DISCONTINUED | OUTPATIENT
Start: 2025-01-13 | End: 2025-01-13 | Stop reason: SURG

## 2025-01-13 RX ORDER — DEXAMETHASONE SODIUM PHOSPHATE 4 MG/ML
INJECTION, SOLUTION INTRA-ARTICULAR; INTRALESIONAL; INTRAMUSCULAR; INTRAVENOUS; SOFT TISSUE AS NEEDED
Status: DISCONTINUED | OUTPATIENT
Start: 2025-01-13 | End: 2025-01-13 | Stop reason: SURG

## 2025-01-13 RX ORDER — FENTANYL CITRATE 50 UG/ML
50 INJECTION, SOLUTION INTRAMUSCULAR; INTRAVENOUS
Status: DISCONTINUED | OUTPATIENT
Start: 2025-01-13 | End: 2025-01-13 | Stop reason: HOSPADM

## 2025-01-13 RX ORDER — FENTANYL CITRATE 50 UG/ML
25 INJECTION, SOLUTION INTRAMUSCULAR; INTRAVENOUS
Status: DISCONTINUED | OUTPATIENT
Start: 2025-01-13 | End: 2025-01-13 | Stop reason: HOSPADM

## 2025-01-13 RX ORDER — PROPOFOL 10 MG/ML
VIAL (ML) INTRAVENOUS AS NEEDED
Status: DISCONTINUED | OUTPATIENT
Start: 2025-01-13 | End: 2025-01-13 | Stop reason: SURG

## 2025-01-13 RX ORDER — SENNOSIDES 8.6 MG
650 CAPSULE ORAL EVERY 8 HOURS PRN
Qty: 60 TABLET | Refills: 0 | Status: SHIPPED | OUTPATIENT
Start: 2025-01-13

## 2025-01-13 RX ORDER — FLUMAZENIL 0.1 MG/ML
0.2 INJECTION INTRAVENOUS AS NEEDED
Status: DISCONTINUED | OUTPATIENT
Start: 2025-01-13 | End: 2025-01-13 | Stop reason: HOSPADM

## 2025-01-13 RX ORDER — IBUPROFEN 400 MG/1
400 TABLET, FILM COATED ORAL EVERY 6 HOURS PRN
Status: ON HOLD | COMMUNITY
End: 2025-01-13

## 2025-01-13 RX ORDER — LABETALOL HYDROCHLORIDE 5 MG/ML
5 INJECTION, SOLUTION INTRAVENOUS
Status: DISCONTINUED | OUTPATIENT
Start: 2025-01-13 | End: 2025-01-13 | Stop reason: HOSPADM

## 2025-01-13 RX ORDER — DOCUSATE SODIUM 100 MG/1
100 CAPSULE, LIQUID FILLED ORAL 2 TIMES DAILY
Qty: 60 CAPSULE | Refills: 0 | Status: SHIPPED | OUTPATIENT
Start: 2025-01-13

## 2025-01-13 RX ORDER — BUPIVACAINE HCL/0.9 % NACL/PF 0.125 %
PLASTIC BAG, INJECTION (ML) EPIDURAL AS NEEDED
Status: DISCONTINUED | OUTPATIENT
Start: 2025-01-13 | End: 2025-01-13

## 2025-01-13 RX ORDER — SODIUM CHLORIDE 9 MG/ML
40 INJECTION, SOLUTION INTRAVENOUS AS NEEDED
Status: DISCONTINUED | OUTPATIENT
Start: 2025-01-13 | End: 2025-01-13 | Stop reason: HOSPADM

## 2025-01-13 RX ORDER — LIDOCAINE HYDROCHLORIDE 10 MG/ML
0.5 INJECTION, SOLUTION EPIDURAL; INFILTRATION; INTRACAUDAL; PERINEURAL ONCE AS NEEDED
Status: DISCONTINUED | OUTPATIENT
Start: 2025-01-13 | End: 2025-01-13 | Stop reason: HOSPADM

## 2025-01-13 RX ADMIN — Medication 100 MCG: at 07:14

## 2025-01-13 RX ADMIN — FENTANYL CITRATE 150 MCG: 50 INJECTION, SOLUTION INTRAMUSCULAR; INTRAVENOUS at 07:26

## 2025-01-13 RX ADMIN — HYDROCODONE BITARTRATE AND ACETAMINOPHEN 1 TABLET: 10; 325 TABLET ORAL at 08:54

## 2025-01-13 RX ADMIN — ROCURONIUM BROMIDE 70 MG: 10 INJECTION, SOLUTION INTRAVENOUS at 07:07

## 2025-01-13 RX ADMIN — ONDANSETRON 4 MG: 2 INJECTION INTRAMUSCULAR; INTRAVENOUS at 08:12

## 2025-01-13 RX ADMIN — FAMOTIDINE 20 MG: 10 INJECTION INTRAVENOUS at 06:49

## 2025-01-13 RX ADMIN — FENTANYL CITRATE 100 MCG: 50 INJECTION, SOLUTION INTRAMUSCULAR; INTRAVENOUS at 07:07

## 2025-01-13 RX ADMIN — PROPOFOL 150 MG: 10 INJECTION, EMULSION INTRAVENOUS at 07:07

## 2025-01-13 RX ADMIN — SODIUM CHLORIDE, POTASSIUM CHLORIDE, SODIUM LACTATE AND CALCIUM CHLORIDE 1000 ML: 600; 310; 30; 20 INJECTION, SOLUTION INTRAVENOUS at 06:00

## 2025-01-13 RX ADMIN — MIDAZOLAM HYDROCHLORIDE 2 MG: 1 INJECTION, SOLUTION INTRAMUSCULAR; INTRAVENOUS at 06:56

## 2025-01-13 RX ADMIN — SUGAMMADEX 200 MG: 100 INJECTION, SOLUTION INTRAVENOUS at 08:24

## 2025-01-13 RX ADMIN — LIDOCAINE HYDROCHLORIDE 1 EACH: 40 SOLUTION TOPICAL at 07:08

## 2025-01-13 RX ADMIN — DEXAMETHASONE SODIUM PHOSPHATE 4 MG: 4 INJECTION, SOLUTION INTRA-ARTICULAR; INTRALESIONAL; INTRAMUSCULAR; INTRAVENOUS; SOFT TISSUE at 07:27

## 2025-01-13 RX ADMIN — LIDOCAINE HYDROCHLORIDE 80 MG: 20 INJECTION, SOLUTION EPIDURAL; INFILTRATION; INTRACAUDAL; PERINEURAL at 07:07

## 2025-01-13 RX ADMIN — CEFAZOLIN 2000 MG: 2 INJECTION, POWDER, FOR SOLUTION INTRAMUSCULAR; INTRAVENOUS at 07:12

## 2025-01-13 RX ADMIN — KETOROLAC TROMETHAMINE 30 MG: 30 INJECTION, SOLUTION INTRAMUSCULAR; INTRAVENOUS at 08:12

## 2025-01-13 RX ADMIN — DEXAMETHASONE SODIUM PHOSPHATE 4 MG: 4 INJECTION, SOLUTION INTRA-ARTICULAR; INTRALESIONAL; INTRAMUSCULAR; INTRAVENOUS; SOFT TISSUE at 06:49

## 2025-01-13 RX ADMIN — METRONIDAZOLE 500 MG: 500 INJECTION, SOLUTION INTRAVENOUS at 06:49

## 2025-01-13 RX ADMIN — SCOPOLAMINE 1 PATCH: 1.5 PATCH, EXTENDED RELEASE TRANSDERMAL at 06:48

## 2025-01-13 NOTE — ANESTHESIA PROCEDURE NOTES
Airway  Urgency: elective    Date/Time: 1/13/2025 7:08 AM  Airway not difficult    General Information and Staff    Patient location during procedure: OR  CRNA/CAA: Josefina Rowley CRNA    Indications and Patient Condition  Indications for airway management: airway protection    Preoxygenated: yes  Mask difficulty assessment: 1 - vent by mask    Final Airway Details  Final airway type: endotracheal airway      Successful airway: ETT  Cuffed: yes   Successful intubation technique: direct laryngoscopy and video laryngoscopy  Facilitating devices/methods: intubating stylet  Endotracheal tube insertion site: oral  Blade: Menard  Blade size: 4  ETT size (mm): 7.0  Cormack-Lehane Classification: grade I - full view of glottis  Placement verified by: chest auscultation and capnometry   Cuff volume (mL): 5  Measured from: lips  ETT/EBT  to lips (cm): 21  Number of attempts at approach: 1  Assessment: lips, teeth, and gum same as pre-op and atraumatic intubation

## 2025-01-13 NOTE — ANESTHESIA PREPROCEDURE EVALUATION
Anesthesia Evaluation     Patient summary reviewed   history of anesthetic complications:  PONV  NPO Solid Status: > 8 hours             Airway   Mallampati: II  TM distance: >3 FB  Neck ROM: full  Dental      Pulmonary    (-) COPD, asthma, sleep apnea, not a smoker  Cardiovascular   Exercise tolerance: excellent (>7 METS)    (+) hypertension  (-) pacemaker, past MI, angina, cardiac stents      Neuro/Psych  (-) seizures, TIA, CVA  GI/Hepatic/Renal/Endo    (+) GERD  (-) liver disease, no renal disease, diabetes    Musculoskeletal     Abdominal    Substance History      OB/GYN    (-)  Pregnant        Other                    Anesthesia Plan    ASA 2     general     intravenous induction     Anesthetic plan, risks, benefits, and alternatives have been provided, discussed and informed consent has been obtained with: patient.    CODE STATUS:

## 2025-01-13 NOTE — PROGRESS NOTES
Bernardino Gonzalez MD  [facility]  [address]  [place] [address]  Office [phone]  Fax [phone]      [facility]  Adelita Ferrell  : 1975  MRN: [id number]    Subjective   Subjective     Chief complaint: surgery for menorrhagia/dysmenorrhea in setting of ablation      History of Present Illness  Adelita Ferrell is a 49 y.o. female , , who comes to the office today for consult for surgery. Still with menorrhagia and dysmenorrhea despite endometrial ablation. Menses sporadic. Bleeding heavy at times. Dysmenorrhea - main issue. Midol does help some but she reports it causes palpitations/feeling jittery. Desires definitive management in the form of hysterectomy.    Review of Systems   Genitourinary:  Positive for menstrual problem and vaginal bleeding. Negative for decreased urine volume, difficulty urinating, dyspareunia, dysuria, enuresis, flank pain, frequency, genital sores, hematuria, pelvic pain, urgency, vaginal discharge and vaginal pain.   All other systems reviewed and are negative.       Personal History     Past Medical History:   Diagnosis Date    Cervical disc disorder 20 yrs ago    GERD (gastroesophageal reflux disease)     Hypertension     Migraine        Past Surgical History:   Procedure Laterality Date     SECTION      ENDOMETRIAL ABLATION  2010    KNEE CARTILAGE SURGERY Right     TUBAL ABDOMINAL LIGATION         Current Outpatient Medications   Medication Instructions    acetaminophen (TYLENOL) 650 mg, Every 8 Hours PRN    EPINEPHrine (EPIPEN) 0.3 MG/0.3ML solution auto-injector injection USE AS DIRECTED FOR ANAPHYLAXIS. GO TO ER IF USED    galcanezumab-gnlm (Emgality) 120 MG/ML auto-injector pen INJECT 1 SYRINGE SUBCUTANEOUSLY ONCE EVERY MONTH    ibuprofen (ADVIL,MOTRIN) 400 mg, Every 6 Hours PRN    losartan (COZAAR) 25 mg, Nightly    rizatriptan (MAXALT) 10 mg, Oral, Once As Needed, May repeat in 2 hours if needed       Allergies   Allergen  Reactions    Alpha-Gal Rash       Social History     Socioeconomic History    Marital status:    Tobacco Use    Smoking status: Former     Current packs/day: 0.25     Average packs/day: 0.3 packs/day for 1 year (0.3 ttl pk-yrs)     Types: Cigarettes     Passive exposure: Never    Smokeless tobacco: Never   Vaping Use    Vaping status: Never Used   Substance and Sexual Activity    Alcohol use: Not Currently     Comment: Occasionally 2 xs months maybe    Drug use: Never    Sexual activity: Yes     Partners: Male     Birth control/protection: Tubal ligation     Comment: W ....       Family History   Problem Relation Age of Onset    No Known Problems Mother     No Known Problems Father        Objective    Objective     Vitals:   Visit Vitals  /97 (BP Location: Left arm, Patient Position: Sitting)   Pulse 72   Temp 98.3 °F (36.8 °C) (Temporal)   Resp 16   SpO2 99%        Physical Exam  Vitals reviewed.   Constitutional:       General: She is not in acute distress.     Appearance: Normal appearance. She is not ill-appearing.   HENT:      Head: Normocephalic and atraumatic.      Nose: No congestion or rhinorrhea.   Eyes:      General: No scleral icterus.        Right eye: No discharge.         Left eye: No discharge.      Extraocular Movements: Extraocular movements intact.      Conjunctiva/sclera: Conjunctivae normal.   Pulmonary:      Effort: Pulmonary effort is normal. No accessory muscle usage or respiratory distress.   Musculoskeletal:      Right lower leg: No edema.      Left lower leg: No edema.   Skin:     General: Skin is warm and dry.      Coloration: Skin is not ashen, cyanotic or jaundiced.   Neurological:      General: No focal deficit present.      Mental Status: She is alert and oriented to person, place, and time.   Psychiatric:         Mood and Affect: Mood normal.         Behavior: Behavior is cooperative.             Result Review    US Non-ob Transvaginal (12/04/2024 09:42)   1.   Uterus: Enlarged, with uterine dimensions 8.1 x 5.4 x 5 cm, and Anteverted     2.  Endometrium:  4.7 mm and ablation noted     3.  Myometrium: Normal homogenous texture     4.  Ovaries  Left:     Follicle noted, 23 mm, otherwise normal appearing ovary  Right:  Normal/unremarkable    Lab Results   Component Value Date    INTERPGYN (A) 12/04/2024     Atypical squamous cells of undetermined significance    HPVAPTIMA Not Detected 12/04/2024       Tissue Pathology Exam (12/04/2024 11:39)   Endometrium, curettage:  Dyssynchronous endometrium.  Complex hyperplasia or atypia are not present.      Tissue Pathology Exam (03/04/2024 11:57)   Endometrium, biopsy:  A.  Superficial fragments of inactive to weakly proliferative endometrium.  B.  Fragments of endocervical tissue.  C.  Blood.  D.  No evidence of atypia or malignancy.        Assessment & Plan   Assessment / Plan     Diagnoses and all orders for this visit:    1. Irregular menses  -     sodium chloride 0.9 % flush 10 mL  -     sodium chloride 0.9 % flush 1-10 mL  -     sodium chloride 0.9 % infusion 40 mL  -     sodium chloride 0.9 % infusion  -     ceFAZolin 2000 mg IVPB in 100 mL NS (MBP)  -     metroNIDAZOLE (FLAGYL) IVPB 500 mg    2. History of endometrial ablation  -     sodium chloride 0.9 % flush 10 mL  -     sodium chloride 0.9 % flush 1-10 mL  -     sodium chloride 0.9 % infusion 40 mL  -     sodium chloride 0.9 % infusion  -     ceFAZolin 2000 mg IVPB in 100 mL NS (MBP)  -     metroNIDAZOLE (FLAGYL) IVPB 500 mg    3. Dysmenorrhea  -     sodium chloride 0.9 % flush 10 mL  -     sodium chloride 0.9 % flush 1-10 mL  -     sodium chloride 0.9 % infusion 40 mL  -     sodium chloride 0.9 % infusion  -     ceFAZolin 2000 mg IVPB in 100 mL NS (MBP)  -     metroNIDAZOLE (FLAGYL) IVPB 500 mg    4. Abnormal uterine bleeding (AUB)  -     sodium chloride 0.9 % flush 10 mL  -     sodium chloride 0.9 % flush 1-10 mL  -     sodium chloride 0.9 % infusion 40 mL  -     sodium  chloride 0.9 % infusion  -     ceFAZolin 2000 mg IVPB in 100 mL NS (MBP)  -     metroNIDAZOLE (FLAGYL) IVPB 500 mg    Other orders  -     Follow Anesthesia Guidelines / Protocol; Standing  -     Clip Operative Site; Standing  -     Verify / Perform Chlorhexidine Skin Prep; Standing  -     Pregnancy, Urine - Urine, Clean Catch; Standing  -     Type & Screen; Standing  -     Insert Peripheral IV; Standing  -     Cancel: Saline Lock & Maintain IV Access; Standing  -     Place Sequential Compression Device; Standing  -     Maintain Sequential Compression Device; Standing  -     Follow Anesthesia Guidelines / Protocol; Standing  -     Follow Anesthesia Guidelines / Protocol  -     Clip Operative Site  -     Verify / Perform Chlorhexidine Skin Prep  -     Pregnancy, Urine - Urine, Clean Catch  -     Type & Screen  -     Insert Peripheral IV  -     Cancel: Saline Lock & Maintain IV Access  -     Place Sequential Compression Device  -     Maintain Sequential Compression Device  -     Follow Anesthesia Guidelines / Protocol  -     Follow Anesthesia Guidelines / Protocol; Standing  -     Please Insert a Second Peripheral IV If Indicated For Procedure (All DaVinci Cases, Gastric Bypass, Sleeve Surgery, AAA, Any Vascular Bypass, Carotid, Sigmoidectomy, Colectomy (Lap Assisted), Colon Resection, Nissen, Exploratory Laparotomy, Spinal...; Standing  -     Insert Peripheral IV; Standing  -     lidocaine PF 1% (XYLOCAINE) injection 0.5 mL  -     Cancel: Maintain IV Access; Standing  -     sodium chloride 0.9 % flush 3 mL  -     lactated ringers infusion 1,000 mL  -     Insert Peripheral IV; Standing  -     Cancel: Maintain IV Access; Standing  -     sodium chloride 0.9 % flush 10 mL  -     lactated ringers infusion 1,000 mL  -     Follow Anesthesia Guidelines / Protocol  -     Please Insert a Second Peripheral IV If Indicated For Procedure (All DaVinci Cases, Gastric Bypass, Sleeve Surgery, AAA, Any Vascular Bypass, Carotid,  Sigmoidectomy, Colectomy (Lap Assisted), Colon Resection, Nissen, Exploratory Laparotomy, Spinal...  -     Insert Peripheral IV  -     Cancel: Maintain IV Access  -     Insert Peripheral IV  -     Cancel: Maintain IV Access  -     Oxygen Therapy- Nasal Cannula; Titrate 1-6 LPM Per SpO2; 90 - 95%; Standing  -     Continuous Pulse Oximetry; Standing  -     POC Glucose STAT; Standing  -     Vital Signs Every 5 Minutes for 15 Minutes, Every 15 Minutes Thereafter.; Standing  -     Apply Warming [place]; Standing  -     Call Anesthesiologist For Additional IV Fluid Bolus For Hypotension / Tachycardia; Standing  -     Notify Anesthesia of Any Acute Changes in Patient Condition; Standing  -     Notify Anesthesia for Unrelieved Pain; Standing  -     ibuprofen (ADVIL,MOTRIN) tablet 600 mg  -     HYDROcodone-acetaminophen (NORCO) 5-325 MG per tablet 1 tablet  -     HYDROcodone-acetaminophen (NORCO)  MG per tablet 1 tablet  -     fentaNYL citrate (PF) (SUBLIMAZE) injection 50 mcg  -     naloxone (NARCAN) injection 0.4 mg  -     flumazenil (ROMAZICON) injection 0.2 mg  -     ondansetron (ZOFRAN) injection 4 mg  -     labetalol (NORMODYNE,TRANDATE) injection 5 mg  -     atropine sulfate injection 0.5 mg  -     Once Discharge Criteria to Floor Met, Follow Surgeon Orders; Standing  -     Discharge Patient From PACU When Discharge Criteria Met; Standing  -     Vital Signs - Per Anesthesia Protocol; Standing  -     Oxygen Therapy- Nasal Cannula; Titrate 1-6 LPM Per SpO2; 90 - 95%; Standing  -     Continuous Pulse Oximetry; Standing  -     Insert Peripheral IV; Standing  -     Saline Lock & Maintain IV Access; Standing  -     sodium chloride 0.9 % flush 10 mL  -     sodium chloride 0.9 % flush 10 mL  -     fentaNYL citrate (PF) (SUBLIMAZE) injection 25 mcg  -     Midazolam HCl (PF) (VERSED) injection 1 mg  -     dextrose (D50W) (25 g/50 mL) IV injection 12.5 g  -     dexAMETHasone (DECADRON) injection 4 mg  -     scopolamine  patch 1 mg/72 hr  -     Midazolam HCl (PF) (VERSED) injection 2 mg  -     famotidine (PEPCID) injection 20 mg  -     Oxygen Therapy- Nasal Cannula; Titrate 1-6 LPM Per SpO2; 90 - 95%  -     Continuous Pulse Oximetry  -     Insert Peripheral IV  -     Saline Lock & Maintain IV Access          Discussion:   Surgical options discussed for definitive management for dysmenorrhea/AUB/irregular menses in setting of prior ablation. Robot-TLH, BSG, Cystoscopy discussed. Surgery, recovery, route of surgery, and limitations postoperatively discussed. Time off reviewed. Questions answered. She expressed understanding of the above and wished to proceed.     Surgical Counseling  The patient was informed of the risks and benefits of the planned procedures. The risks included but were not limited to: bleeding, infection, injury to surrounding structures potentially including the vascular, gastrointestinal, and genitourinary systems, nerve injury, possible blood transfusion and its associated risks, possible bilateral oophorectomy. Patient was counseled on the possibility of a laparotomy, and all other indicated procedures. We discussed the possibility of difficulties with anesthesia, potential exacerbations to other health conditions, and potential concern for chronic pain that follows any surgery. Patient expressed understanding of the risks involved. Her questions were answered to her satisfaction. No guarantees were made or implied. The patient consented to proceed with the planned procedures.    Plan for robot-TLH, BSG, cystoscopy on 1/13/2025.     Bernardino Gonzalez MD

## 2025-01-13 NOTE — H&P (VIEW-ONLY)
Bernardino Gonzalez MD  [facility]  [address]  [place] [address]  Office [phone]  Fax [phone]      [facility]  Adelita Ferrell  : 1975  MRN: [id number]    Subjective   Subjective     Chief complaint: surgery for menorrhagia/dysmenorrhea in setting of ablation      History of Present Illness  Adelita Ferrell is a 49 y.o. female , , who comes to the office today for consult for surgery. Still with menorrhagia and dysmenorrhea despite endometrial ablation. Menses sporadic. Bleeding heavy at times. Dysmenorrhea - main issue. Midol does help some but she reports it causes palpitations/feeling jittery. Desires definitive management in the form of hysterectomy.    Review of Systems   Genitourinary:  Positive for menstrual problem and vaginal bleeding. Negative for decreased urine volume, difficulty urinating, dyspareunia, dysuria, enuresis, flank pain, frequency, genital sores, hematuria, pelvic pain, urgency, vaginal discharge and vaginal pain.   All other systems reviewed and are negative.       Personal History     Past Medical History:   Diagnosis Date    Cervical disc disorder 20 yrs ago    GERD (gastroesophageal reflux disease)     Hypertension     Migraine        Past Surgical History:   Procedure Laterality Date     SECTION      ENDOMETRIAL ABLATION  2010    KNEE CARTILAGE SURGERY Right     TUBAL ABDOMINAL LIGATION         Current Outpatient Medications   Medication Instructions    acetaminophen (TYLENOL) 650 mg, Every 8 Hours PRN    EPINEPHrine (EPIPEN) 0.3 MG/0.3ML solution auto-injector injection USE AS DIRECTED FOR ANAPHYLAXIS. GO TO ER IF USED    galcanezumab-gnlm (Emgality) 120 MG/ML auto-injector pen INJECT 1 SYRINGE SUBCUTANEOUSLY ONCE EVERY MONTH    ibuprofen (ADVIL,MOTRIN) 400 mg, Every 6 Hours PRN    losartan (COZAAR) 25 mg, Nightly    rizatriptan (MAXALT) 10 mg, Oral, Once As Needed, May repeat in 2 hours if needed       Allergies   Allergen  Reactions    Alpha-Gal Rash       Social History     Socioeconomic History    Marital status:    Tobacco Use    Smoking status: Former     Current packs/day: 0.25     Average packs/day: 0.3 packs/day for 1 year (0.3 ttl pk-yrs)     Types: Cigarettes     Passive exposure: Never    Smokeless tobacco: Never   Vaping Use    Vaping status: Never Used   Substance and Sexual Activity    Alcohol use: Not Currently     Comment: Occasionally 2 xs months maybe    Drug use: Never    Sexual activity: Yes     Partners: Male     Birth control/protection: Tubal ligation     Comment: W ....       Family History   Problem Relation Age of Onset    No Known Problems Mother     No Known Problems Father        Objective    Objective     Vitals:   Visit Vitals  /97 (BP Location: Left arm, Patient Position: Sitting)   Pulse 72   Temp 98.3 °F (36.8 °C) (Temporal)   Resp 16   SpO2 99%        Physical Exam  Vitals reviewed.   Constitutional:       General: She is not in acute distress.     Appearance: Normal appearance. She is not ill-appearing.   HENT:      Head: Normocephalic and atraumatic.      Nose: No congestion or rhinorrhea.   Eyes:      General: No scleral icterus.        Right eye: No discharge.         Left eye: No discharge.      Extraocular Movements: Extraocular movements intact.      Conjunctiva/sclera: Conjunctivae normal.   Pulmonary:      Effort: Pulmonary effort is normal. No accessory muscle usage or respiratory distress.   Musculoskeletal:      Right lower leg: No edema.      Left lower leg: No edema.   Skin:     General: Skin is warm and dry.      Coloration: Skin is not ashen, cyanotic or jaundiced.   Neurological:      General: No focal deficit present.      Mental Status: She is alert and oriented to person, place, and time.   Psychiatric:         Mood and Affect: Mood normal.         Behavior: Behavior is cooperative.             Result Review    US Non-ob Transvaginal (12/04/2024 09:42)   1.   Uterus: Enlarged, with uterine dimensions 8.1 x 5.4 x 5 cm, and Anteverted     2.  Endometrium:  4.7 mm and ablation noted     3.  Myometrium: Normal homogenous texture     4.  Ovaries  Left:     Follicle noted, 23 mm, otherwise normal appearing ovary  Right:  Normal/unremarkable    Lab Results   Component Value Date    INTERPGYN (A) 12/04/2024     Atypical squamous cells of undetermined significance    HPVAPTIMA Not Detected 12/04/2024       Tissue Pathology Exam (12/04/2024 11:39)   Endometrium, curettage:  Dyssynchronous endometrium.  Complex hyperplasia or atypia are not present.      Tissue Pathology Exam (03/04/2024 11:57)   Endometrium, biopsy:  A.  Superficial fragments of inactive to weakly proliferative endometrium.  B.  Fragments of endocervical tissue.  C.  Blood.  D.  No evidence of atypia or malignancy.        Assessment & Plan   Assessment / Plan     Diagnoses and all orders for this visit:    1. Irregular menses  -     sodium chloride 0.9 % flush 10 mL  -     sodium chloride 0.9 % flush 1-10 mL  -     sodium chloride 0.9 % infusion 40 mL  -     sodium chloride 0.9 % infusion  -     ceFAZolin 2000 mg IVPB in 100 mL NS (MBP)  -     metroNIDAZOLE (FLAGYL) IVPB 500 mg    2. History of endometrial ablation  -     sodium chloride 0.9 % flush 10 mL  -     sodium chloride 0.9 % flush 1-10 mL  -     sodium chloride 0.9 % infusion 40 mL  -     sodium chloride 0.9 % infusion  -     ceFAZolin 2000 mg IVPB in 100 mL NS (MBP)  -     metroNIDAZOLE (FLAGYL) IVPB 500 mg    3. Dysmenorrhea  -     sodium chloride 0.9 % flush 10 mL  -     sodium chloride 0.9 % flush 1-10 mL  -     sodium chloride 0.9 % infusion 40 mL  -     sodium chloride 0.9 % infusion  -     ceFAZolin 2000 mg IVPB in 100 mL NS (MBP)  -     metroNIDAZOLE (FLAGYL) IVPB 500 mg    4. Abnormal uterine bleeding (AUB)  -     sodium chloride 0.9 % flush 10 mL  -     sodium chloride 0.9 % flush 1-10 mL  -     sodium chloride 0.9 % infusion 40 mL  -     sodium  chloride 0.9 % infusion  -     ceFAZolin 2000 mg IVPB in 100 mL NS (MBP)  -     metroNIDAZOLE (FLAGYL) IVPB 500 mg    Other orders  -     Follow Anesthesia Guidelines / Protocol; Standing  -     Clip Operative Site; Standing  -     Verify / Perform Chlorhexidine Skin Prep; Standing  -     Pregnancy, Urine - Urine, Clean Catch; Standing  -     Type & Screen; Standing  -     Insert Peripheral IV; Standing  -     Cancel: Saline Lock & Maintain IV Access; Standing  -     Place Sequential Compression Device; Standing  -     Maintain Sequential Compression Device; Standing  -     Follow Anesthesia Guidelines / Protocol; Standing  -     Follow Anesthesia Guidelines / Protocol  -     Clip Operative Site  -     Verify / Perform Chlorhexidine Skin Prep  -     Pregnancy, Urine - Urine, Clean Catch  -     Type & Screen  -     Insert Peripheral IV  -     Cancel: Saline Lock & Maintain IV Access  -     Place Sequential Compression Device  -     Maintain Sequential Compression Device  -     Follow Anesthesia Guidelines / Protocol  -     Follow Anesthesia Guidelines / Protocol; Standing  -     Please Insert a Second Peripheral IV If Indicated For Procedure (All DaVinci Cases, Gastric Bypass, Sleeve Surgery, AAA, Any Vascular Bypass, Carotid, Sigmoidectomy, Colectomy (Lap Assisted), Colon Resection, Nissen, Exploratory Laparotomy, Spinal...; Standing  -     Insert Peripheral IV; Standing  -     lidocaine PF 1% (XYLOCAINE) injection 0.5 mL  -     Cancel: Maintain IV Access; Standing  -     sodium chloride 0.9 % flush 3 mL  -     lactated ringers infusion 1,000 mL  -     Insert Peripheral IV; Standing  -     Cancel: Maintain IV Access; Standing  -     sodium chloride 0.9 % flush 10 mL  -     lactated ringers infusion 1,000 mL  -     Follow Anesthesia Guidelines / Protocol  -     Please Insert a Second Peripheral IV If Indicated For Procedure (All DaVinci Cases, Gastric Bypass, Sleeve Surgery, AAA, Any Vascular Bypass, Carotid,  Sigmoidectomy, Colectomy (Lap Assisted), Colon Resection, Nissen, Exploratory Laparotomy, Spinal...  -     Insert Peripheral IV  -     Cancel: Maintain IV Access  -     Insert Peripheral IV  -     Cancel: Maintain IV Access  -     Oxygen Therapy- Nasal Cannula; Titrate 1-6 LPM Per SpO2; 90 - 95%; Standing  -     Continuous Pulse Oximetry; Standing  -     POC Glucose STAT; Standing  -     Vital Signs Every 5 Minutes for 15 Minutes, Every 15 Minutes Thereafter.; Standing  -     Apply Warming [place]; Standing  -     Call Anesthesiologist For Additional IV Fluid Bolus For Hypotension / Tachycardia; Standing  -     Notify Anesthesia of Any Acute Changes in Patient Condition; Standing  -     Notify Anesthesia for Unrelieved Pain; Standing  -     ibuprofen (ADVIL,MOTRIN) tablet 600 mg  -     HYDROcodone-acetaminophen (NORCO) 5-325 MG per tablet 1 tablet  -     HYDROcodone-acetaminophen (NORCO)  MG per tablet 1 tablet  -     fentaNYL citrate (PF) (SUBLIMAZE) injection 50 mcg  -     naloxone (NARCAN) injection 0.4 mg  -     flumazenil (ROMAZICON) injection 0.2 mg  -     ondansetron (ZOFRAN) injection 4 mg  -     labetalol (NORMODYNE,TRANDATE) injection 5 mg  -     atropine sulfate injection 0.5 mg  -     Once Discharge Criteria to Floor Met, Follow Surgeon Orders; Standing  -     Discharge Patient From PACU When Discharge Criteria Met; Standing  -     Vital Signs - Per Anesthesia Protocol; Standing  -     Oxygen Therapy- Nasal Cannula; Titrate 1-6 LPM Per SpO2; 90 - 95%; Standing  -     Continuous Pulse Oximetry; Standing  -     Insert Peripheral IV; Standing  -     Saline Lock & Maintain IV Access; Standing  -     sodium chloride 0.9 % flush 10 mL  -     sodium chloride 0.9 % flush 10 mL  -     fentaNYL citrate (PF) (SUBLIMAZE) injection 25 mcg  -     Midazolam HCl (PF) (VERSED) injection 1 mg  -     dextrose (D50W) (25 g/50 mL) IV injection 12.5 g  -     dexAMETHasone (DECADRON) injection 4 mg  -     scopolamine  patch 1 mg/72 hr  -     Midazolam HCl (PF) (VERSED) injection 2 mg  -     famotidine (PEPCID) injection 20 mg  -     Oxygen Therapy- Nasal Cannula; Titrate 1-6 LPM Per SpO2; 90 - 95%  -     Continuous Pulse Oximetry  -     Insert Peripheral IV  -     Saline Lock & Maintain IV Access          Discussion:   Surgical options discussed for definitive management for dysmenorrhea/AUB/irregular menses in setting of prior ablation. Robot-TLH, BSG, Cystoscopy discussed. Surgery, recovery, route of surgery, and limitations postoperatively discussed. Time off reviewed. Questions answered. She expressed understanding of the above and wished to proceed.     Surgical Counseling  The patient was informed of the risks and benefits of the planned procedures. The risks included but were not limited to: bleeding, infection, injury to surrounding structures potentially including the vascular, gastrointestinal, and genitourinary systems, nerve injury, possible blood transfusion and its associated risks, possible bilateral oophorectomy. Patient was counseled on the possibility of a laparotomy, and all other indicated procedures. We discussed the possibility of difficulties with anesthesia, potential exacerbations to other health conditions, and potential concern for chronic pain that follows any surgery. Patient expressed understanding of the risks involved. Her questions were answered to her satisfaction. No guarantees were made or implied. The patient consented to proceed with the planned procedures.    Plan for robot-TLH, BSG, cystoscopy on 1/13/2025.     Bernardino Gonzalez MD

## 2025-01-13 NOTE — ANESTHESIA POSTPROCEDURE EVALUATION
Patient: Adelita Ferrell    Procedure Summary       Date: 01/13/25 Room / Location:  [facility] OR  /  [facility]    Anesthesia Start: 0702 Anesthesia Stop: 0843    Procedure: TOTAL LAPAROSCOPIC HYSTERECTOMY BILATERAL SALPINGECTOMY WITH DAVINCI ROBOT, CYSTO (Bilateral) Diagnosis:       Irregular menses      History of endometrial ablation      Dysmenorrhea      Abnormal uterine bleeding (AUB)      (Irregular menses [N92.6])      (History of endometrial ablation [Z98.890])      (Dysmenorrhea [N94.6])      (Abnormal uterine bleeding (AUB) [N93.9])    Surgeons: Bernardino Gonzalez MD Provider: Josefina Rowley CRNA    Anesthesia Type: general ASA Status: 2            Anesthesia Type: general    Vitals  Vitals Value Taken Time   /76 01/13/25 0855   Temp 98.7 °F (37.1 °C) 01/13/25 0854   Pulse 65 01/13/25 0903   Resp 14 01/13/25 0854   SpO2 93 % 01/13/25 0903   Vitals shown include unfiled device data.        Post Anesthesia Care and Evaluation    Patient location during evaluation: PACU  Patient participation: complete - patient participated  Level of consciousness: awake and alert  Pain management: adequate    Airway patency: patent  Anesthetic complications: No anesthetic complications    Cardiovascular status: acceptable  Respiratory status: acceptable  Hydration status: acceptable    Comments: Blood pressure 109/75, pulse 64, temperature 98.7 °F (37.1 °C), resp. rate 16, SpO2 94%, not currently breastfeeding.    Pt discharged from PACU based on nabeel score >8

## 2025-01-13 NOTE — TELEPHONE ENCOUNTER
Patient had a hysterectomy this morning.  She has scopolamine patch on but it is not helping.  She has tried Zofran with no improvement.  She got home about noon today and has had trouble keeping anything down.  She has some Phenergan 25 mg and is going to try that.  She will call me back with no improvement.

## 2025-01-13 NOTE — OP NOTE
BH Estela Ferrell  : 1975  MRN: [id number]  [facility]: [id number]  Date: 2022    Operative Note      Pre-op Diagnosis:  Irregular menses [N92.6]  History of endometrial ablation [Z98.890]  Dysmenorrhea [N94.6]  Abnormal uterine bleeding (AUB) [N93.9]   Post-op Diagnosis:  Irregular menses [N92.6]  History of endometrial ablation [Z98.890]  Dysmenorrhea [N94.6]  Abnormal uterine bleeding (AUB) [N93.9]     Procedure: Procedure(s):  TOTAL LAPAROSCOPIC HYSTERECTOMY BILATERAL SALPINGECTOMY WITH DAVINCI ROBOT, CYSTO     Surgeon: Surgeon(s):  Bernardino Gonzalez MD       Anesthesia: General     Estimated Blood Loss: 15   mls   Fluids: 800   mls   Urine output: 500   mls clear   Drains: none   ABx: IV 2g Ancef, IV flagyl 500 mg       Specimens:  Uterus, cervix, bilateral fallopian tubes   Findings: Bimanual exam revealed mobile, normal uterus without palpable adnexal masses. Ovaries palpated normal. On laparoscopy uterus and bilateral ovarian and fallopian tubes appeared normal. Prior [place] tubal noted. Liver with cirrhotic appearance, gallbladder, stomach, and appendix appeared normal. Cystoscopy with bilateral spill of clear stained urine from each ureteral orifice. Bladder was water tight without foreign bodies, masses/tumors, or lesions on cystoscopy.   Complications: none     PROCEDURE IN DETAIL:  Patient was taken to the operating room where general anesthesia was established. She was placed in the dorsal lithotomy position using Jaquan stirrups to support the lower extremities with her arms tucked to her side. Antibiotics were confirmed to have been administered. She was prepped and draped in the usual sterile fashion. A Parks catheter was inserted. A time-out procedure was performed to confirm the correct procedure and correct patient.     Attention was then turned to the abdomen.   0.5% marcaine with epinephrine was injected at the planned site of entry. A small vertical incision was made  approximately 4 cm superior to the umbilicus. Under direct visualization using the robot trocar, direct entry with the trocar and camera were inserted uneventfully into the peritoneal cavity. The peritoneal cavity was then insufflated with CO2 gas to a maximum pressure of 15 mmHg. Opening pressure <8 mmHg. Examination of the peritoneal cavity revealed no signs of injury from entry. Subsequent robotic-laparoscopic 8mm trocars were then inserted under direct visualization in a similar fashion. These trocars were placed in a crescent-shaped fashion along the upper abdominal quadrants. A total of 4 trocars (3 robotic and 1 assistant port) were placed. The patient was then placed in 26 degrees Trendelenburg position.      The robotic device was then docked with the following instruments in each robotic port (from patient left to right):   1) n/a  2) fenestrated bipolar forceps   3) 0 degree camera   4) monopolar scissors     Attention was then turned to the pelvis. A speculum was then placed into the vagina where the anterior cervix was grasped with a single-tooth tenaculum.  Cervix was then dilated to allow for introduction of the V-care uterine manipulator.  2-0 Vicryl stay sutures were then placed on the cervix at the 3- and 9-o'clock positions. The medium V-care uterine manipulator was placed and the stay sutures were tied to the V-care.     The right fallopian tube was then grasped and elevated away from the right ovary to allow access to the mesosalpinx. The mesosalpinx was then cauterized with the fenestrated bipolars and then cut with the monopolar scissors towards the utero-ovarian ligament.  The right round ligament was then cauterized with the Fenestrated bipolar forceps and cut with the monopolar scissors. The right utero-ovarian ligament was then cauterized with the fenestrated bipolar forceps and then cut with the monopolar scissors. The monopolar scissors were then used to incise the broad ligament  anteriorly to develop the right side of the bladder flap. Attention was then turned to the left side of the uterus. The left fallopian tube was then grasped and elevated away from the left ovary to allow access to the mesosalpinx. The mesosalpinx was then cauterized with the fenestrated bipolars and then cut with the monopolar scissors towards the utero-ovarian ligament. The left round ligament was then coagulated and cut in a similar manner followed by the left utero-ovarian ligament. The monopolar scissors were then used to incise the broad ligament anteriorly to develop the left side of the bladder flap.     The left uterine artery was then cauterized with the fenestrated bipolar and then cut with the monopolar scissors. This was again repeated in a similar fashion for the right uterine artery. The bladder was then further retracted off the lower uterine segment and off the cervix anteriorly with blunt and sharp dissection with the monopolar scissors. At this point, the uterus was noted to be blanched in color. The uterus and cervix were then amputated from the vagina using the monopolar cautery. The uterus was then extracted through the vagina. The vaginal cuff was then closed using the #0 V- day suture in a running fashion starting at the right vaginal cuff apex and advancing towards the left apex, incorporating the uterosacrals for apical support. The remaining suture was placed in a running fashion as an imbricating stitch on the vaginal cuff. Good hemostasis was noted.  Attention was then turned to the perineum.     The Parks catheter was removed and the cystoscope was then introduced through the urethra into the bladder. Under direct visualization using normal saline solution distending media, the ureters were identified bilaterally and efflux was noted from the ureteral orifices bilaterally. The cystoscope was removed and the Parks catheter was replaced. The perineum was evaluated and noted intact.  Speculum exam noted hemostatic, well approximated vaginal cuff.  This concluded the procedure. The robot was undocked. Pneumoperitoneum was evacuated and all laparoscopic trocars were removed. The skin incisions were then closed with 4-O Monocryl in simple, interrupted stitches. Skin glue was applied to the incisions.     All needle, sponge, and instrument counts were noted to be correct x 3 at the end of the procedure. The patient tolerated the procedure well and was taken to the recovery room in stable condition.     Patient's spouse/family updated following surgery. Surgery, recovery, and post-operative expectations discussed. They were encouraged to call with any questions postoperatively. Their questions were answered. They expressed understanding.     Bernardino Gonzalez MD   1/13/2025  08:34 CST

## 2025-01-28 ENCOUNTER — OFFICE VISIT (OUTPATIENT)
Dept: OBSTETRICS AND GYNECOLOGY | Age: 50
End: 2025-01-28
Payer: COMMERCIAL

## 2025-01-28 VITALS
SYSTOLIC BLOOD PRESSURE: 124 MMHG | DIASTOLIC BLOOD PRESSURE: 76 MMHG | BODY MASS INDEX: 24.1 KG/M2 | HEIGHT: 68 IN | WEIGHT: 159 LBS

## 2025-01-28 DIAGNOSIS — Z09 POSTOPERATIVE FOLLOW-UP: Primary | ICD-10-CM

## 2025-01-28 PROCEDURE — 99024 POSTOP FOLLOW-UP VISIT: CPT | Performed by: OBSTETRICS & GYNECOLOGY

## 2025-01-28 NOTE — PROGRESS NOTES
"    Bernardino Gonzalez MD  [facility]  [address]  [place] [address]  Office [phone]  Fax [phone]      Subjective   Subjective     Adelita Ferrell is a 49 y.o. female who presents to the clinic 2 weeks status post robot-TLH/BSG, cystoscopy for irregular menses, h/o endometrial ablation, AUB, dysmenorrhea.     Eating a regular diet without difficulty.   Bowel movements are normal.   Voiding without difficulty.  Pain is controlled without any medications. More back pain/muscle soreness. States thinks this is more from limiting physical activity until recently.   Vaginal bleeding: none      Review of Systems  ROS notable for: meeting postoperative milestones    The following portions of the patient's history were reviewed and updated as appropriate: allergies, current medications, past family history, past medical history, past social history, past surgical history, and problem list.          Objective   Objective   Visit Vitals  /76   Ht 173 cm (68.11\")   Wt 72.1 kg (159 lb)   LMP 12/04/2024   BMI 24.10 kg/m²     Physical Exam  Vitals and nursing note reviewed.   Constitutional:       Appearance: Normal appearance.   HENT:      Head: Normocephalic and atraumatic.   Eyes:      General: No scleral icterus.     Conjunctiva/sclera: Conjunctivae normal.   Abdominal:      General: There is no distension.      Palpations: Abdomen is soft.      Tenderness: There is no abdominal tenderness. There is no guarding or rebound.      Comments: Incisions: clean and dry, appears to be healing well, no erythema/bleeding/discharge from incisions   Neurological:      Mental Status: She is alert.               Result Review    Op Note by Bernardino Gonzalez MD (01/13/2025 07:22)     Tissue Pathology Exam (01/13/2025 08:07)    Uterus, cervix, bilateral fallopian tubes, hysterectomy and bilateral salpingectomy:  Chronic cervicitis.  Previous ablation of the endometrium.  Adenomyosis.  1 leiomyoma.  Unremarkable " left and right fallopian tubes and fimbrial epithelial lining.  Uterine weight of 167.4 g.       Assessment & Plan   Assessment / Plan   Doing well postoperatively.  Operative findings again reviewed.   Pathology report discussed.  Incision care reviewed.  Activity restrictions reviewed:  pelvic rest, no lifting <25 lbs  Anticipated return to work: at reduced duties.    Diagnoses and all orders for this visit:    1. Postoperative follow-up (Primary)         Return in about 4 weeks (around 2/25/2025) for Post-op.         Bernardino Gonzalez MD  1/28/2025  10:36 CST

## 2025-02-17 ENCOUNTER — OFFICE VISIT (OUTPATIENT)
Dept: NEUROLOGY | Facility: CLINIC | Age: 50
End: 2025-02-17
Payer: COMMERCIAL

## 2025-02-17 VITALS
DIASTOLIC BLOOD PRESSURE: 72 MMHG | HEIGHT: 68 IN | SYSTOLIC BLOOD PRESSURE: 128 MMHG | OXYGEN SATURATION: 98 % | HEART RATE: 76 BPM | BODY MASS INDEX: 23.49 KG/M2 | WEIGHT: 155 LBS

## 2025-02-17 DIAGNOSIS — G43.719 INTRACTABLE CHRONIC MIGRAINE WITHOUT AURA AND WITHOUT STATUS MIGRAINOSUS: Primary | ICD-10-CM

## 2025-02-17 PROCEDURE — 99214 OFFICE O/P EST MOD 30 MIN: CPT

## 2025-02-17 RX ORDER — HYDROXYZINE HYDROCHLORIDE 25 MG/1
1 TABLET, FILM COATED ORAL AS NEEDED
COMMUNITY
Start: 2025-01-29

## 2025-02-17 NOTE — PROGRESS NOTES
Neurology Consult Note    [facility]  [address], [address]  [place] [address]  Phone: [phone]  Fax: [phone]    Referring Provider:   No ref. provider found  Primary Care Provider:  Marita Agarwal APRN    Reason for Consult:  Chronic migraine  Subjective    Very pleasant 49-year-old female seen for the follow-up of chronic migraine.  Last seen in clinic by me on 2024.  During that encounter we had continued her on Emgality 120 mg monthly injection as well as Maxalt 10 mg as needed for chronic migraine .    Today she presents by herself.  Reports to me she did have a hysterectomy .  Afterwards she did have some severe side effects related anesthesia.  She did recently have an uptick and has taken 3 rizatriptan since her hysterectomy.  With that she will be back to her normal self within 2 hours.  She is very pleased.    Preventative medications tried:  Emgality.  Topiramate.  Failed due to side effects     Abortive medications tried:  Sumatriptan.  Limited benefit  Rizatriptan.  Good clinical benefit     Medications contraindicated:  Antihypertensive medication class due to concurrent use of losartan with normal blood pressure in clinic today.  Heart rate 62.    Adelita Ferrell presents to [facility]    History of Present Illness    Patient Active Problem List   Diagnosis    DDD (degenerative disc disease), cervical    Overweight with body mass index (BMI) of 25 to 25.9 in adult    Nonsmoker    Irregular menses    History of endometrial ablation    Dysmenorrhea    Abnormal uterine bleeding (AUB)        Past Medical History:   Diagnosis Date    Cervical disc disorder 20 yrs ago    GERD (gastroesophageal reflux disease)     Headache, tension-type     Hypertension     Migraine         Social History     Socioeconomic History    Marital status:    Tobacco Use    Smoking status: Former     Current packs/day: 0.25      "Average packs/day: 0.3 packs/day for 1.2 years (0.3 ttl pk-yrs)     Types: Cigarettes     Passive exposure: Never    Smokeless tobacco: Never   Vaping Use    Vaping status: Never Used   Substance and Sexual Activity    Alcohol use: Not Currently     Comment: Occasionally 2 xs months maybe    Drug use: Never    Sexual activity: Yes     Partners: Male     Birth control/protection: Tubal ligation, Hysterectomy     Comment: W ....        Allergies   Allergen Reactions    Alpha-Gal Rash          Current Outpatient Medications:     EPINEPHrine (EPIPEN) 0.3 MG/0.3ML solution auto-injector injection, USE AS DIRECTED FOR ANAPHYLAXIS. GO TO ER IF USED, Disp: , Rfl:     galcanezumab-gnlm (Emgality) 120 MG/ML auto-injector pen, INJECT 1 SYRINGE SUBCUTANEOUSLY ONCE EVERY MONTH, Disp: 1 mL, Rfl: 11    hydrOXYzine (ATARAX) 25 MG tablet, Take 1 tablet by mouth As Needed., Disp: , Rfl:     losartan (COZAAR) 25 MG tablet, Take 1 tablet by mouth Every Night., Disp: , Rfl:     ondansetron ODT (ZOFRAN-ODT) 4 MG disintegrating tablet, Place 1 tablet on the tongue Every 8 (Eight) Hours As Needed for Vomiting or Nausea., Disp: 21 tablet, Rfl: 0    rizatriptan (Maxalt) 10 MG tablet, Take 1 tablet by mouth 1 (One) Time As Needed for Migraine. May repeat in 2 hours if needed, Disp: 9 tablet, Rfl: 6       Objective   Vital Signs:   /72   Pulse 76   Ht 172.7 cm (68\")   Wt 70.3 kg (155 lb)   SpO2 98%   BMI 23.57 kg/m²       Physical Exam  Vitals and nursing note reviewed.   Constitutional:       Appearance: Normal appearance.   HENT:      Head: Normocephalic.   Eyes:      General: Lids are normal.      Extraocular Movements: Extraocular movements intact.   Pulmonary:      Effort: Pulmonary effort is normal. No respiratory distress.   Skin:     General: Skin is warm and dry.   Psychiatric:         Speech: Speech normal.        Neurological Exam  Mental Status  Awake, alert and oriented to person, place and time. Speech is " normal. Language is fluent with no aphasia.    Cranial Nerves  CN III, IV, VI: Extraocular movements intact bilaterally. Normal lids and orbits bilaterally.  CN VII: Full and symmetric facial movement.  CN XII: Tongue midline without atrophy or fasciculations.    Motor    Is all extremities equally.    Gait  Casual gait is normal including stance, stride, and arm swing.      Result Review :   The following data was reviewed by: ALISSA Thrasher on 2025:       Progress Notes by Nomi Voss APRN (2024 11:00)                Impression:  Adelita Ferrell is a 49 y.o. female who presents for evaluation of chronic migraine.  She has had good control with Emgality as a preventative regimen.  Additionally rizatriptan works as an abortive agent with normal functioning within 2 hours.  No changes today.    Diagnoses and all orders for this visit:    1. Intractable chronic migraine without aura and without status migrainosus (Primary)        Plan:  Continue Emgality 120 mg monthly injection for chronic migraine prevention  Continue rizatriptan 10 mg tablet as needed for chronic migraine   Avoid known triggers  Contact her office with any concerns  Follow-up with primary care as scheduled  Follow-up in my clinic 6 months or sooner if needed    The patient and I have discussed the plan of care and she is in full agreement at this time.     Follow Up   Return in about 6 months (around 2025) for Migraine.            ALISSA Thrasher  25  13:56 CST

## 2025-02-25 ENCOUNTER — OFFICE VISIT (OUTPATIENT)
Dept: OBSTETRICS AND GYNECOLOGY | Age: 50
End: 2025-02-25
Payer: COMMERCIAL

## 2025-02-25 VITALS
SYSTOLIC BLOOD PRESSURE: 118 MMHG | WEIGHT: 161.4 LBS | DIASTOLIC BLOOD PRESSURE: 78 MMHG | BODY MASS INDEX: 24.46 KG/M2 | HEIGHT: 68 IN

## 2025-02-25 DIAGNOSIS — Z12.11 SCREENING FOR MALIGNANT NEOPLASM OF COLON: ICD-10-CM

## 2025-02-25 DIAGNOSIS — Z12.31 SCREENING MAMMOGRAM FOR BREAST CANCER: ICD-10-CM

## 2025-02-25 DIAGNOSIS — Z01.419 ENCOUNTER FOR ANNUAL ROUTINE GYNECOLOGICAL EXAMINATION: ICD-10-CM

## 2025-02-25 DIAGNOSIS — Z09 POSTOPERATIVE FOLLOW-UP: Primary | ICD-10-CM

## 2025-02-25 NOTE — PROGRESS NOTES
"    Bernardino Gonzalez MD  [facility]  [address]  [place] [address]  Office [phone]  Fax [phone]    .  Subjective   Subjective     Chief Complaint   Patient presents with    Post-op Follow-up     Pt here for post-op visit. TLH BSG on 1/13.     Gynecologic Exam     Pt here for annual exam.  Pap 12/4/24 ASCUS -HPV  Mammo 3/21/24 BI-RADS 1       Adelita Ferrell is a 49 y.o. female who presents to the clinic 6 weeks status post robot-assisted TLH/BSG/cystoscopy. Here for postop visit and annual. Doing well. No complaints.      Eating a regular diet without difficulty.   Bowel movements are normal.   Voiding without difficulty.  The patient is not having any pain.  Vaginal bleeding: none  Denies breast complaints    Review of Systems  ROS notable for: meeting postoperative milestones    The following portions of the patient's history were reviewed and updated as appropriate: allergies, current medications, past family history, past medical history, past social history, past surgical history, and problem list.          Objective   Objective   Visit Vitals  /78   Ht 172.7 cm (68\")   Wt 73.2 kg (161 lb 6.4 oz)   BMI 24.54 kg/m²     Physical Exam  Vitals and nursing note reviewed. Exam conducted with a chaperone present.   Constitutional:       Appearance: Normal appearance.   HENT:      Head: Normocephalic and atraumatic.   Eyes:      General: No scleral icterus.     Conjunctiva/sclera: Conjunctivae normal.   Pulmonary:      Effort: Pulmonary effort is normal. No respiratory distress.   Chest:   Breasts:     Breasts are symmetrical.      Right: Normal. No mass, nipple discharge, skin change or tenderness.      Left: Normal. No mass, nipple discharge, skin change or tenderness.      Comments: Consent for exam obtained verbally from patient.   Abdominal:      General: There is no distension.      Palpations: Abdomen is soft.      Tenderness: There is no abdominal tenderness. There is no " guarding or rebound.      Comments: Incisions: clean and dry, well healed and approximated, no erythema or discharge, no TTP   Genitourinary:     General: Normal vulva.      Exam position: Lithotomy position.      Pubic Area: No rash or pubic lice.       Labia:         Right: No rash, tenderness or lesion.         Left: No rash, tenderness or lesion.       Urethra: No prolapse or urethral lesion.      Vagina: Normal.      Uterus: Absent.       Adnexa: Right adnexa normal and left adnexa normal.      Rectum: No external hemorrhoid.      Comments: Vaginal cuff - well healed and approximated, sutures still present; no dehiscence, no bleeding, no abnormal discharge    Cervix: absent  Musculoskeletal:         General: Normal range of motion.   Lymphadenopathy:      Upper Body:      Right upper body: No supraclavicular, axillary or pectoral adenopathy.      Left upper body: No supraclavicular, axillary or pectoral adenopathy.   Skin:     General: Skin is warm and dry.   Neurological:      General: No focal deficit present.      Mental Status: She is alert.      Cranial Nerves: No cranial nerve deficit.   Psychiatric:         Mood and Affect: Mood normal.               Result Review    MAMMO outside films (12/11/2019 12:15)     Op Note by Bernardino Gonzalez MD (01/13/2025 07:22)     Tissue Pathology Exam (01/13/2025 08:07)   1.  Uterus, cervix, bilateral fallopian tubes, hysterectomy and bilateral salpingectomy:  Chronic cervicitis.  Previous ablation of the endometrium.  Adenomyosis.  1 leiomyoma.  Unremarkable left and right fallopian tubes and fimbrial epithelial lining.  Uterine weight of 167.4 g.       Assessment & Plan   Assessment / Plan   Doing well postoperatively.  Operative findings again reviewed.   Pathology report discussed.  Incision care reviewed.  Activity restrictions reviewed: none  Anticipated return to work: now.    Diagnoses and all orders for this visit:    1. Postoperative follow-up (Primary)    2.  Encounter for annual routine gynecological examination    3. Screening mammogram for breast cancer  -     Mammo screening digital tomosynthesis bilateral w CAD; Future    4. Screening for malignant neoplasm of colon  -     Ambulatory Referral For Screening Colonoscopy       BMI Body mass index is 24.54 kg/m²..   Colonoscopy:  reports she thinks she is due, reported needs one every 3 years for polyp history  Mammogram: Ordered today  DEXA:  at age 66  Pap smear, per ASCCP guidelines, not applicable    AVS/Patient education handout on the following as well w/discussion  Encouraged self breast awareness.  Encouraged proactive weight management and importance of maintaining a healthy weight.   Encouraged regular exercise and the importance of same, in regards to a healthy heart as well as helping to maintain her weight and improving her mental health.          Return in about 1 year (around 2/25/2026), or if symptoms worsen or fail to improve, for annual.         Bernardino Gonzalez MD  2/25/2025  10:43 CST

## 2025-03-09 LAB
NCCN CRITERIA FLAG: NORMAL
TYRER CUZICK SCORE: 8.5

## 2025-03-24 ENCOUNTER — HOSPITAL ENCOUNTER (OUTPATIENT)
Dept: MAMMOGRAPHY | Facility: HOSPITAL | Age: 50
Discharge: HOME | End: 2025-03-24
Admitting: OBSTETRICS & GYNECOLOGY
Payer: COMMERCIAL

## 2025-03-24 DIAGNOSIS — Z12.31 SCREENING MAMMOGRAM FOR BREAST CANCER: ICD-10-CM

## 2025-03-24 PROCEDURE — 77063 BREAST TOMOSYNTHESIS BI: CPT

## 2025-03-24 PROCEDURE — 77067 SCR MAMMO BI INCL CAD: CPT

## 2025-03-27 ENCOUNTER — OFFICE VISIT (OUTPATIENT)
Dept: GASTROENTEROLOGY | Facility: CLINIC | Age: 50
End: 2025-03-27
Payer: COMMERCIAL

## 2025-03-27 VITALS
BODY MASS INDEX: 24.1 KG/M2 | SYSTOLIC BLOOD PRESSURE: 130 MMHG | OXYGEN SATURATION: 97 % | TEMPERATURE: 98.2 F | HEART RATE: 87 BPM | DIASTOLIC BLOOD PRESSURE: 82 MMHG | WEIGHT: 159 LBS | HEIGHT: 68 IN

## 2025-03-27 DIAGNOSIS — K76.0 FATTY LIVER: Primary | ICD-10-CM

## 2025-03-27 RX ORDER — BUSPIRONE HYDROCHLORIDE 7.5 MG/1
TABLET ORAL EVERY 12 HOURS SCHEDULED
COMMUNITY
Start: 2025-03-25

## 2025-03-27 NOTE — PROGRESS NOTES
Chief Complaint   Patient presents with    GI Problem     Fatty liver       PCP: Phyllis Hermosillo MD  REFER: Phyllis Hermosillo MD    Subjective     HPI    Adelita Ferrell referred to office for evaluation of for liver fibrosis. Adelita Ferrell states she was told her liver had appearance of cirrhosis during hysterectomy.  No alcohol use.  No family history of liver disease.  No abdominal pain, no weight loss.  Bowels move without difficulty.  No bright red blood per rectum, no melena.      History of colon polyps - [place] - Dr Blake-Adelita Ferrell states she is due 2026          CBC (No Diff) (2025 12:59)   Plt 254    25      Negative hepatitis testing             Past Medical History:   Diagnosis Date    Cervical disc disorder 20 yrs ago    GERD (gastroesophageal reflux disease)     Headache, tension-type     Hypertension     Migraine        Past Surgical History:   Procedure Laterality Date     SECTION      ENDOMETRIAL ABLATION      KNEE CARTILAGE SURGERY Right     TOTAL LAPAROSCOPIC HYSTERECTOMY SALPINGECTOMY Bilateral 2025    Procedure: TOTAL LAPAROSCOPIC HYSTERECTOMY BILATERAL SALPINGECTOMY WITH DAVINCI ROBOT, CYSTO;  Surgeon: Bernardino Gonzalez MD;  [facility]: [facility];  Service: Robotics - DaVinci;  Laterality: Bilateral;    TUBAL ABDOMINAL LIGATION         Outpatient Medications Marked as Taking for the 3/27/25 encounter (Office Visit) with Jc Ellison APRN   Medication Sig Dispense Refill    busPIRone (BUSPAR) 7.5 MG tablet Every 12 (Twelve) Hours.      EPINEPHrine (EPIPEN) 0.3 MG/0.3ML solution auto-injector injection USE AS DIRECTED FOR ANAPHYLAXIS. GO TO ER IF USED      galcanezumab-gnlm (Emgality) 120 MG/ML auto-injector pen INJECT 1 SYRINGE SUBCUTANEOUSLY ONCE EVERY MONTH 1 mL 11    hydrOXYzine (ATARAX) 25 MG tablet Take 1 tablet by mouth As Needed.      losartan (COZAAR) 25 MG tablet Take 1 tablet by mouth Every Night.      rizatriptan (Maxalt) 10 MG tablet  "Take 1 tablet by mouth 1 (One) Time As Needed for Migraine. May repeat in 2 hours if needed 9 tablet 6       Allergies   Allergen Reactions    Alpha-Gal Rash       Social History     Socioeconomic History    Marital status:    Tobacco Use    Smoking status: Former     Current packs/day: 0.25     Average packs/day: 0.3 packs/day for 1.2 years (0.3 ttl pk-yrs)     Types: Cigarettes     Passive exposure: Never    Smokeless tobacco: Never   Vaping Use    Vaping status: Never Used   Substance and Sexual Activity    Alcohol use: Not Currently     Comment: Occasionally 2 xs months maybe    Drug use: Never    Sexual activity: Yes     Partners: Male     Birth control/protection: Tubal ligation, Hysterectomy     Comment: W ....       Review of Systems   Constitutional:  Negative for fever and unexpected weight change.   HENT:  Negative for trouble swallowing.    Respiratory:  Negative for shortness of breath.    Cardiovascular:  Negative for chest pain.   Gastrointestinal:  Negative for abdominal pain and anal bleeding.           Objective     Vitals:    03/27/25 1011   BP: 130/82   Pulse: 87   Temp: 98.2 °F (36.8 °C)   SpO2: 97%   Weight: 72.1 kg (159 lb)   Height: 172.7 cm (68\")     Body mass index is 24.18 kg/m².    Physical Exam  Constitutional:       Appearance: Normal appearance. She is well-developed.   Eyes:      General: No scleral icterus.  Cardiovascular:      Heart sounds: Normal heart sounds. No murmur heard.  Pulmonary:      Effort: Pulmonary effort is normal.   Abdominal:      General: Bowel sounds are normal. There is no distension.      Palpations: Abdomen is soft.      Tenderness: There is no abdominal tenderness. There is no guarding.   Skin:     General: Skin is warm and dry.      Coloration: Skin is not jaundiced.   Neurological:      Mental Status: She is alert.   Psychiatric:         Behavior: Behavior is cooperative.         Imaging Results (Most Recent)       None            Body mass " index is 24.18 kg/m².    Assessment & Plan     Diagnoses and all orders for this visit:    1. Fatty liver (Primary)  -     Cancel: US Liver; Future  -     US Liver; Future         * Surgery not found *      FIB 4 - 0.84-indicative that advanced fibrosis is excluded    Current labs are not indicative of advanced liver disease.  Discussed liver biopsy for definitive diagnosis vs repeat US.  Adelita Ferrell wished to repeat her ultrasound.  Further consideration for workup pending result of ultrasound  I discussed role of cholesterol/blood sugar on fatty liver and recommend improvement in cholesterol.      Discussed diet/exercise  Recommend routine lab work with pcp     Colonoscopy due Jan 2026- we will place her in recall.  I asked her to call office when she received her letter and we could schedule procedure via phone as long as no changes in health history.     I discussed how fatty liver can lead to cirrhosis, disability and pre-mature death.  How it also can be a sign of increased risk for cardiovascular disease.  I discussed the importance of getting rid of fat in the liver by controlling lipids and glucose, avoiding etoh helps, and gradual weight loss to ideal body weight is very important.           Jc Ellison, APRN  03/27/25          There are no Patient Instructions on file for this visit.

## 2025-03-28 ENCOUNTER — PATIENT ROUNDING (BHMG ONLY) (OUTPATIENT)
Dept: GASTROENTEROLOGY | Facility: CLINIC | Age: 50
End: 2025-03-28
Payer: COMMERCIAL

## 2025-03-28 NOTE — PROGRESS NOTES
March 28, 2025    Hello, may I speak with Adelita Ferrell?    My name is Mauro      I am  with [facility]  [address], [address]  [facility] [address] [phone].    Before we get started may I verify your date of birth? 1975    I am calling to officially welcome you to our practice and ask about your recent visit. Is this a good time to talk? yes    Tell me about your visit with us. What things went well?  Great       We're always looking for ways to make our patients' experiences even better. Do you have recommendations on ways we may improve?  no    Overall were you satisfied with your first visit to our practice? yes       I appreciate you taking the time to speak with me today. Is there anything else I can do for you? no      Thank you, and have a great day.

## 2025-04-08 ENCOUNTER — HOSPITAL ENCOUNTER (OUTPATIENT)
Dept: ULTRASOUND IMAGING | Facility: HOSPITAL | Age: 50
Discharge: HOME | End: 2025-04-08
Admitting: NURSE PRACTITIONER
Payer: COMMERCIAL

## 2025-04-08 DIAGNOSIS — K76.0 FATTY LIVER: ICD-10-CM

## 2025-04-08 PROCEDURE — 76705 ECHO EXAM OF ABDOMEN: CPT

## 2025-08-25 ENCOUNTER — OFFICE VISIT (OUTPATIENT)
Dept: NEUROLOGY | Facility: CLINIC | Age: 50
End: 2025-08-25
Payer: COMMERCIAL

## 2025-08-25 VITALS
BODY MASS INDEX: 25.76 KG/M2 | SYSTOLIC BLOOD PRESSURE: 130 MMHG | HEART RATE: 74 BPM | OXYGEN SATURATION: 98 % | HEIGHT: 68 IN | WEIGHT: 170 LBS | DIASTOLIC BLOOD PRESSURE: 74 MMHG

## 2025-08-25 DIAGNOSIS — R45.89 ANXIETY ABOUT HEALTH: ICD-10-CM

## 2025-08-25 DIAGNOSIS — G43.719 INTRACTABLE CHRONIC MIGRAINE WITHOUT AURA AND WITHOUT STATUS MIGRAINOSUS: Primary | ICD-10-CM

## 2025-08-25 PROCEDURE — 99214 OFFICE O/P EST MOD 30 MIN: CPT

## 2025-08-25 RX ORDER — RIZATRIPTAN BENZOATE 10 MG/1
10 TABLET ORAL ONCE AS NEEDED
Qty: 9 TABLET | Refills: 6 | Status: SHIPPED | OUTPATIENT
Start: 2025-08-25

## 2025-08-25 RX ORDER — GALCANEZUMAB 120 MG/ML
120 INJECTION, SOLUTION SUBCUTANEOUS
Qty: 3 ML | Refills: 3 | Status: SHIPPED | OUTPATIENT
Start: 2025-08-25

## (undated) DEVICE — ST TBG AIRSEAL FLTR TRI LUM

## (undated) DEVICE — MANIP UTER ELEVATOR/PRO W/LNG/HNDL CERV/CUP 35MM MD

## (undated) DEVICE — KT CLN CLEANOR SCPE

## (undated) DEVICE — SEAL

## (undated) DEVICE — DAVINCI: Brand: MEDLINE INDUSTRIES, INC.

## (undated) DEVICE — GLV SURG SENSICARE W/ALOE PF LF 7.5 STRL

## (undated) DEVICE — TROC BLADLES ANCHORPORT/OPTI LP 8X120MM 1P/U

## (undated) DEVICE — BLADELESS OBTURATOR: Brand: WECK VISTA

## (undated) DEVICE — INTENDED TO AID IN THE PASSING OF SUTURES THROUGH BONE AND SOFT TISSUE DURING ORTHOPEDIC SURGERY: Brand: HOFFEE SUTURE RETRIEVER

## (undated) DEVICE — ADHS SKIN PREMIERPRO EXOFIN TOPICAL HI/VISC .5ML

## (undated) DEVICE — ANTIBACTERIAL UNDYED BRAIDED (POLYGLACTIN 910), SYNTHETIC ABSORBABLE SUTURE: Brand: COATED VICRYL

## (undated) DEVICE — SYR LL TP 10ML STRL

## (undated) DEVICE — CLTH CLENS READYCLEANSE PERI CARE PK/5

## (undated) DEVICE — SYR LUERLOK 30CC

## (undated) DEVICE — SYR CONTRL LUERLOK 10CC

## (undated) DEVICE — SUT MNCRYL 4/0 PS2 27IN UD MCP426H

## (undated) DEVICE — TIP COVER ACCESSORY

## (undated) DEVICE — ARM DRAPE

## (undated) DEVICE — NEEDLE,22GX1.5",REG,BEVEL: Brand: MEDLINE

## (undated) DEVICE — PK POSTN TRENGUARD450 PROC

## (undated) DEVICE — PATIENT RETURN ELECTRODE, SINGLE-USE, CONTACT QUALITY MONITORING, ADULT, WITH 9FT CORD, FOR PATIENTS WEIGING OVER 33LBS. (15KG): Brand: MEGADYNE